# Patient Record
Sex: FEMALE | Race: BLACK OR AFRICAN AMERICAN | NOT HISPANIC OR LATINO | Employment: FULL TIME | URBAN - METROPOLITAN AREA
[De-identification: names, ages, dates, MRNs, and addresses within clinical notes are randomized per-mention and may not be internally consistent; named-entity substitution may affect disease eponyms.]

---

## 2018-06-19 ENCOUNTER — APPOINTMENT (OUTPATIENT)
Dept: URGENT CARE | Facility: MEDICAL CENTER | Age: 28
End: 2018-06-19

## 2018-06-19 ENCOUNTER — TRANSCRIBE ORDERS (OUTPATIENT)
Dept: URGENT CARE | Facility: MEDICAL CENTER | Age: 28
End: 2018-06-19

## 2018-06-19 ENCOUNTER — APPOINTMENT (OUTPATIENT)
Dept: LAB | Facility: MEDICAL CENTER | Age: 28
End: 2018-06-19

## 2018-06-19 DIAGNOSIS — Z02.1 PRE-EMPLOYMENT EXAMINATION: Primary | ICD-10-CM

## 2018-06-19 DIAGNOSIS — Z02.1 PRE-EMPLOYMENT EXAMINATION: ICD-10-CM

## 2018-06-19 PROCEDURE — 86480 TB TEST CELL IMMUN MEASURE: CPT

## 2018-06-19 PROCEDURE — 36415 COLL VENOUS BLD VENIPUNCTURE: CPT

## 2018-06-21 LAB
ANNOTATION COMMENT IMP: NORMAL
GAMMA INTERFERON BACKGROUND BLD IA-ACNC: 0.07 IU/ML
M TB IFN-G BLD-IMP: NEGATIVE
M TB IFN-G CD4+ BCKGRND COR BLD-ACNC: <0.01 IU/ML
M TB IFN-G CD4+ T-CELLS BLD-ACNC: 0.04 IU/ML
MITOGEN IGNF BLD-ACNC: 8.36 IU/ML
QUANTIFERON-TB GOLD IN TUBE: NORMAL
SERVICE CMNT-IMP: NORMAL

## 2018-08-09 ENCOUNTER — APPOINTMENT (OUTPATIENT)
Dept: LAB | Facility: HOSPITAL | Age: 28
End: 2018-08-09
Payer: COMMERCIAL

## 2018-08-09 ENCOUNTER — TRANSCRIBE ORDERS (OUTPATIENT)
Dept: ADMINISTRATIVE | Facility: HOSPITAL | Age: 28
End: 2018-08-09

## 2018-08-09 DIAGNOSIS — Z00.8 HEALTH EXAMINATION IN POPULATION SURVEY: Primary | ICD-10-CM

## 2018-08-09 DIAGNOSIS — Z00.8 HEALTH EXAMINATION IN POPULATION SURVEY: ICD-10-CM

## 2018-08-09 LAB
CHOLEST SERPL-MCNC: 113 MG/DL
EST. AVERAGE GLUCOSE BLD GHB EST-MCNC: 108 MG/DL
HBA1C MFR BLD: 5.4 % (ref 4.2–6.3)
HDLC SERPL-MCNC: 32 MG/DL (ref 40–59)
LDLC SERPL CALC-MCNC: 68 MG/DL
NONHDLC SERPL-MCNC: 81 MG/DL
TRIGL SERPL-MCNC: 63 MG/DL

## 2018-08-09 PROCEDURE — 36415 COLL VENOUS BLD VENIPUNCTURE: CPT

## 2018-08-09 PROCEDURE — 83036 HEMOGLOBIN GLYCOSYLATED A1C: CPT

## 2018-08-09 PROCEDURE — 80061 LIPID PANEL: CPT

## 2018-10-30 ENCOUNTER — OFFICE VISIT (OUTPATIENT)
Dept: FAMILY MEDICINE CLINIC | Facility: CLINIC | Age: 28
End: 2018-10-30
Payer: COMMERCIAL

## 2018-10-30 VITALS
DIASTOLIC BLOOD PRESSURE: 78 MMHG | SYSTOLIC BLOOD PRESSURE: 118 MMHG | BODY MASS INDEX: 22.15 KG/M2 | HEIGHT: 63 IN | WEIGHT: 125 LBS

## 2018-10-30 DIAGNOSIS — Z12.4 SCREENING FOR CERVICAL CANCER: ICD-10-CM

## 2018-10-30 DIAGNOSIS — Z13.1 SCREENING FOR DIABETES MELLITUS: ICD-10-CM

## 2018-10-30 DIAGNOSIS — Z13.0 SCREENING FOR DEFICIENCY ANEMIA: ICD-10-CM

## 2018-10-30 DIAGNOSIS — Z13.29 SCREENING FOR THYROID DISORDER: ICD-10-CM

## 2018-10-30 DIAGNOSIS — Z00.00 WELL ADULT EXAM: Primary | ICD-10-CM

## 2018-10-30 PROCEDURE — 99385 PREV VISIT NEW AGE 18-39: CPT | Performed by: FAMILY MEDICINE

## 2018-10-30 NOTE — ASSESSMENT & PLAN NOTE
dsicsseused diet and exercise Patient to add a multiple vitamin for women Patient will be referred to [facility] for PAP smear Patient will have labs done Patient to see me in one year

## 2018-10-30 NOTE — PROGRESS NOTES
Assessment/Plan:    Well adult exam  dsicsseused diet and exercise Patient to add a multiple vitamin for women Patient will be referred to 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 Patient went to [employer] [facility] [employer] Patient has no chronic medical problems Patient has not had a PAP smear She has had her shots and will get us a copy She is exercising about 2 times per week Patient cooks for herself and is not having any diet issues         The following portions of the patient's history were reviewed and updated as appropriate: allergies, current medications, past social history and problem list     Review of Systems   Constitutional: Negative for activity change, appetite change, chills, fatigue and fever  HENT: Negative for congestion, ear discharge, ear pain, hearing loss, postnasal drip, sinus pressure, sore throat and trouble swallowing  Eyes: Negative for pain, discharge, redness, itching and visual disturbance  Respiratory: Negative for cough, chest tightness and shortness of breath  Cardiovascular: Negative for chest pain, palpitations and leg swelling  Gastrointestinal: Negative for abdominal pain, blood in stool, constipation, diarrhea, nausea and vomiting  Endocrine: Negative for cold intolerance, heat intolerance, polydipsia, polyphagia and polyuria  Genitourinary: Negative for difficulty urinating, dysuria, menstrual problem, urgency, vaginal bleeding and vaginal discharge  Musculoskeletal: Negative for arthralgias, back pain, gait problem, myalgias, neck pain and neck stiffness     Skin: Negative for rash and wound  Allergic/Immunologic: Negative for environmental allergies and food allergies  Neurological: Negative for dizziness, tremors, seizures, weakness and headaches  Hematological: Negative for adenopathy  Does not bruise/bleed easily  Psychiatric/Behavioral: Negative for confusion and sleep disturbance  The patient is not nervous/anxious  Objective:      /78   Ht 5' 3" (1 6 m)   Wt 56 7 kg (125 lb)   BMI 22 14 kg/m²          Physical Exam   Constitutional: She is oriented to person, place, and time  She appears well-developed and well-nourished  HENT:   Head: Normocephalic and atraumatic  Right Ear: External ear normal    Left Ear: External ear normal    Nose: Nose normal    Mouth/Throat: Oropharynx is clear and moist    Eyes: Pupils are equal, round, and reactive to light  Conjunctivae and EOM are normal  Right eye exhibits no discharge  Left eye exhibits no discharge  Neck: Normal range of motion  Neck supple  No JVD present  No tracheal deviation present  No thyromegaly present  Cardiovascular: Normal rate, normal heart sounds and intact distal pulses  Pulmonary/Chest: Effort normal and breath sounds normal  She has no wheezes  She has no rales  Abdominal: Soft  Bowel sounds are normal  She exhibits no distension and no mass  There is no tenderness  There is no rebound  Musculoskeletal: Normal range of motion  Lymphadenopathy:     She has no cervical adenopathy  Neurological: She is alert and oriented to person, place, and time  She has normal reflexes  No cranial nerve deficit  Coordination normal    Skin: Skin is warm and dry  No rash noted  Psychiatric: She has a normal mood and affect  Her behavior is normal  Judgment and thought content normal    Nursing note and vitals reviewed

## 2018-10-30 NOTE — PATIENT INSTRUCTIONS
Wellness Visit for Adults   WHAT YOU NEED TO KNOW:   What is a wellness visit? A wellness visit is when you see your healthcare provider to get screened for health problems  You can also get advice on how to stay healthy  Write down your questions so you remember to ask them  Ask your healthcare provider how often you should have a wellness visit  What happens at a wellness visit? Your healthcare provider will ask about your health, and your family history of health problems  This includes high blood pressure, heart disease, and cancer  He or she will ask if you have symptoms that concern you, if you smoke, and about your mood  You may also be asked about your intake of medicines, supplements, food, and alcohol  Any of the following may be done:  · Your weight  will be checked  Your height may also be checked so your body mass index (BMI) can be calculated  Your BMI shows if you are at a healthy weight  · Your blood pressure  and heart rate will be checked  Your temperature may also be checked  · Blood and urine tests  may be done  Blood tests may be done to check your cholesterol levels  Abnormal cholesterol levels increase your risk for heart disease and stroke  You may also need a blood or urine test to check for diabetes if you are at increased risk  Urine tests may be done to look for signs of an infection or kidney disease  · A physical exam  includes checking your heartbeat and lungs with a stethoscope  Your healthcare provider may also check your skin to look for sun damage  · Screening tests  may be recommended  A screening test is done to check for diseases that may not cause symptoms  The screening tests you may need depend on your age, gender, family history, and lifestyle habits  For example, colorectal screening may be recommended if you are 48years old or older  What screening tests do I need if I am a woman? · A Pap smear  is used to screen for cervical cancer   Pap smears are usually done every 3 to 5 years depending on your age  You may need them more often if you have had abnormal Pap smear test results in the past  Ask your healthcare provider how often you should have a Pap smear  · A mammogram  is an x-ray of your breasts to screen for breast cancer  Experts recommend mammograms every 2 years starting at age 48 years  You may need a mammogram at age 52 years or younger if you have an increased risk for breast cancer  Talk to your healthcare provider about when you should start having mammograms and how often you need them  What vaccines might I need? · Get an influenza vaccine  every year  The influenza vaccine protects you from the flu  Several types of viruses cause the flu  The viruses change over time, so new vaccines are made each year  · Get a tetanus-diphtheria (Td) booster vaccine  every 10 years  This vaccine protects you against tetanus and diphtheria  Tetanus is a severe infection that may cause painful muscle spasms and lockjaw  Diphtheria is a severe bacterial infection that causes a thick covering in the back of your mouth and throat  · Get a human papillomavirus (HPV) vaccine  if you are female and aged 23 to 32 or male 23 to 24 and never received it  This vaccine protects you from HPV infection  HPV is the most common infection spread by sexual contact  HPV may also cause vaginal, penile, and anal cancers  · Get a pneumococcal vaccine  if you are aged 72 years or older  The pneumococcal vaccine is an injection given to protect you from pneumococcal disease  Pneumococcal disease is an infection caused by pneumococcal bacteria  The infection may cause pneumonia, meningitis, or an ear infection  · Get a shingles vaccine  if you are aged 61 or older, even if you have had shingles before  The shingles vaccine is an injection to protect you from the varicella-zoster virus  This is the same virus that causes chickenpox   Shingles is a painful rash that develops in people who had chickenpox or have been exposed to the virus  How can I eat healthy? My Plate is a model for planning healthy meals  It shows the types and amounts of foods that should go on your plate  Fruits and vegetables make up about half of your plate, and grains and protein make up the other half  A serving of dairy is included on the side of your plate  The amount of calories and serving sizes you need depends on your age, gender, weight, and height  Examples of healthy foods are listed below:  · Eat a variety of vegetables  such as dark green, red, and orange vegetables  You can also include canned vegetables low in sodium (salt) and frozen vegetables without added butter or sauces  · Eat a variety of fresh fruits , canned fruit in 100% juice, frozen fruit, and dried fruit  · Include whole grains  At least half of the grains you eat should be whole grains  Examples include whole-wheat bread, wheat pasta, brown rice, and whole-grain cereals such as oatmeal     · Eat a variety of protein foods such as seafood (fish and shellfish), lean meat, and poultry without skin (turkey and chicken)  Examples of lean meats include pork leg, shoulder, or tenderloin, and beef round, sirloin, tenderloin, and extra lean ground beef  Other protein foods include eggs and egg substitutes, beans, peas, soy products, nuts, and seeds  · Choose low-fat dairy products such as skim or 1% milk or low-fat yogurt, cheese, and cottage cheese  · Limit unhealthy fats  such as butter, hard margarine, and shortening  How much exercise do I need? Exercise at least 30 minutes per day on most days of the week  Some examples of exercise include walking, biking, dancing, and swimming  You can also fit in more physical activity by taking the stairs instead of the elevator or parking farther away from stores  Include muscle strengthening activities 2 days each week  Regular exercise provides many health benefits  It helps you manage your weight, and decreases your risk for type 2 diabetes, heart disease, stroke, and high blood pressure  Exercise can also help improve your mood  Ask your healthcare provider about the best exercise plan for you  What are some general health and safety guidelines I should follow? · Do not smoke  Nicotine and other chemicals in cigarettes and cigars can cause lung damage  Ask your healthcare provider for information if you currently smoke and need help to quit  E-cigarettes or smokeless tobacco still contain nicotine  Talk to your healthcare provider before you use these products  · Limit alcohol  A drink of alcohol is 12 ounces of beer, 5 ounces of wine, or 1½ ounces of liquor  · Lose weight, if needed  Being overweight increases your risk of certain health conditions  These include heart disease, high blood pressure, type 2 diabetes, and certain types of cancer  · Protect your skin  Do not sunbathe or use tanning beds  Use sunscreen with a SPF 15 or higher  Apply sunscreen at least 15 minutes before you go outside  Reapply sunscreen every 2 hours  Wear protective clothing, hats, and sunglasses when you are outside  · Drive safely  Always wear your seatbelt  Make sure everyone in your car wears a seatbelt  A seatbelt can save your life if you are in an accident  Do not use your cell phone when you are driving  This could distract you and cause an accident  Pull over if you need to make a call or send a text message  · Practice safe sex  Use latex condoms if are sexually active and have more than one partner  Your healthcare provider may recommend screening tests for sexually transmitted infections (STIs)  · Wear helmets, lifejackets, and protective gear  Always wear a helmet when you ride a bike or motorcycle, go skiing, or play sports that could cause a head injury  Wear protective equipment when you play sports   Wear a lifejacket when you are on a boat or doing water sports  CARE AGREEMENT:   You have the right to help plan your care  Learn about your health condition and how it may be treated  Discuss treatment options with your caregivers to decide what care you want to receive  You always have the right to refuse treatment  The above information is an  only  It is not intended as medical advice for individual conditions or treatments  Talk to your doctor, nurse or pharmacist before following any medical regimen to see if it is safe and effective for you  © 2017 [address] [employer] Information is for End User's use only and may not be sold, redistributed or otherwise used for commercial purposes  All illustrations and images included in CareNotes® are the copyrighted property of [employer]  or [employer]

## 2018-10-31 ENCOUNTER — APPOINTMENT (OUTPATIENT)
Dept: LAB | Facility: HOSPITAL | Age: 28
End: 2018-10-31
Payer: COMMERCIAL

## 2018-10-31 DIAGNOSIS — Z13.1 SCREENING FOR DIABETES MELLITUS: ICD-10-CM

## 2018-10-31 DIAGNOSIS — Z00.00 WELL ADULT EXAM: ICD-10-CM

## 2018-10-31 DIAGNOSIS — Z13.29 SCREENING FOR THYROID DISORDER: ICD-10-CM

## 2018-10-31 DIAGNOSIS — Z13.0 SCREENING FOR DEFICIENCY ANEMIA: ICD-10-CM

## 2018-10-31 LAB
ALBUMIN SERPL BCP-MCNC: 4.2 G/DL (ref 3–5.2)
ALP SERPL-CCNC: 62 U/L (ref 43–122)
ALT SERPL W P-5'-P-CCNC: 21 U/L (ref 9–52)
ANION GAP SERPL CALCULATED.3IONS-SCNC: 10 MMOL/L (ref 5–14)
AST SERPL W P-5'-P-CCNC: 19 U/L (ref 14–36)
BASOPHILS # BLD AUTO: 0 THOUSANDS/ΜL (ref 0–0.1)
BASOPHILS NFR BLD AUTO: 1 % (ref 0–1)
BILIRUB SERPL-MCNC: 0.6 MG/DL
BUN SERPL-MCNC: 8 MG/DL (ref 5–25)
CALCIUM SERPL-MCNC: 9.1 MG/DL (ref 8.4–10.2)
CHLORIDE SERPL-SCNC: 101 MMOL/L (ref 97–108)
CO2 SERPL-SCNC: 28 MMOL/L (ref 22–30)
CREAT SERPL-MCNC: 0.69 MG/DL (ref 0.6–1.2)
EOSINOPHIL # BLD AUTO: 0.2 THOUSAND/ΜL (ref 0–0.4)
EOSINOPHIL NFR BLD AUTO: 3 % (ref 0–6)
ERYTHROCYTE [DISTWIDTH] IN BLOOD BY AUTOMATED COUNT: 13.3 %
GFR SERPL CREATININE-BSD FRML MDRD: 138 ML/MIN/1.73SQ M
GLUCOSE P FAST SERPL-MCNC: 79 MG/DL (ref 70–99)
HCT VFR BLD AUTO: 38.9 % (ref 36–46)
HGB BLD-MCNC: 12.8 G/DL (ref 12–16)
LYMPHOCYTES # BLD AUTO: 2.2 THOUSANDS/ΜL (ref 0.5–4)
LYMPHOCYTES NFR BLD AUTO: 33 % (ref 20–50)
MCH RBC QN AUTO: 28.4 PG (ref 26–34)
MCHC RBC AUTO-ENTMCNC: 32.9 G/DL (ref 31–36)
MCV RBC AUTO: 86 FL (ref 80–100)
MONOCYTES # BLD AUTO: 0.7 THOUSAND/ΜL (ref 0.2–0.9)
MONOCYTES NFR BLD AUTO: 10 % (ref 1–10)
NEUTROPHILS # BLD AUTO: 3.6 THOUSANDS/ΜL (ref 1.8–7.8)
NEUTS SEG NFR BLD AUTO: 53 % (ref 45–65)
PLATELET # BLD AUTO: 265 THOUSANDS/UL (ref 150–450)
PMV BLD AUTO: 8.9 FL (ref 8.9–12.7)
POTASSIUM SERPL-SCNC: 4.2 MMOL/L (ref 3.6–5)
PROT SERPL-MCNC: 7.2 G/DL (ref 5.9–8.4)
RBC # BLD AUTO: 4.52 MILLION/UL (ref 4–5.2)
SODIUM SERPL-SCNC: 139 MMOL/L (ref 137–147)
TSH SERPL DL<=0.05 MIU/L-ACNC: 1.7 UIU/ML (ref 0.47–4.68)
WBC # BLD AUTO: 6.8 THOUSAND/UL (ref 4.5–11)

## 2018-10-31 PROCEDURE — 36415 COLL VENOUS BLD VENIPUNCTURE: CPT

## 2018-10-31 PROCEDURE — 84443 ASSAY THYROID STIM HORMONE: CPT

## 2018-10-31 PROCEDURE — 80053 COMPREHEN METABOLIC PANEL: CPT

## 2018-10-31 PROCEDURE — 85025 COMPLETE CBC W/AUTO DIFF WBC: CPT

## 2018-11-01 ENCOUNTER — OFFICE VISIT (OUTPATIENT)
Dept: OBGYN CLINIC | Facility: CLINIC | Age: 28
End: 2018-11-01
Payer: COMMERCIAL

## 2018-11-01 VITALS
DIASTOLIC BLOOD PRESSURE: 60 MMHG | WEIGHT: 124.2 LBS | HEIGHT: 63 IN | SYSTOLIC BLOOD PRESSURE: 128 MMHG | BODY MASS INDEX: 22.01 KG/M2

## 2018-11-01 DIAGNOSIS — Z01.419 ENCOUNTER FOR ANNUAL ROUTINE GYNECOLOGICAL EXAMINATION: Primary | ICD-10-CM

## 2018-11-01 DIAGNOSIS — A64 STD (FEMALE): ICD-10-CM

## 2018-11-01 DIAGNOSIS — Z12.4 CERVICAL CANCER SCREENING: ICD-10-CM

## 2018-11-01 PROCEDURE — G0143 SCR C/V CYTO,THINLAYER,RESCR: HCPCS | Performed by: OBSTETRICS & GYNECOLOGY

## 2018-11-01 PROCEDURE — 87591 N.GONORRHOEAE DNA AMP PROB: CPT | Performed by: OBSTETRICS & GYNECOLOGY

## 2018-11-01 PROCEDURE — 99385 PREV VISIT NEW AGE 18-39: CPT | Performed by: OBSTETRICS & GYNECOLOGY

## 2018-11-01 PROCEDURE — 87491 CHLMYD TRACH DNA AMP PROBE: CPT | Performed by: OBSTETRICS & GYNECOLOGY

## 2018-11-01 NOTE — PROGRESS NOTES
Assessment/Plan:    Pap with reflex done    STD testing-chlamydia and gonorrhea testing done today and she was given a lab slip for hepatitis, HIV and RPR     Contraception- she will use condoms if she is sexually active again and if she is interested in something else, she will call  No problem-specific Assessment & Plan notes found for this encounter  Diagnoses and all orders for this visit:    Encounter for annual routine gynecological examination    Cervical cancer screening    STD (female)  -     Hepatitis panel, acute; Future  -     HIV 1/2 AG-AB combo; Future  -     RPR; Future          Subjective:      Patient ID: Emy Darden is a 33 y o  female  New patient-33year-old female presents for her 1st gyn exam   She has regular menstrual cycles and has no complaints about her cycles  She has been sexually active in the past in used condoms but is not currently sexually active and does not want contraception at this time  She has never had a Pap or STD testing  She has no gyn complaints  The following portions of the patient's history were reviewed and updated as appropriate: allergies, current medications, past family history, past medical history, past social history, past surgical history and problem list     Review of Systems   Constitutional: Negative  HENT: Negative  Eyes: Negative  Respiratory: Negative  Cardiovascular: Negative  Gastrointestinal: Negative  Endocrine: Negative  Genitourinary: Negative  Musculoskeletal: Negative  Skin: Negative  Allergic/Immunologic: Negative  Neurological: Negative  Hematological: Negative  Psychiatric/Behavioral: Negative  Objective:      /60 (BP Location: Left arm, Patient Position: Sitting, Cuff Size: Adult)   Ht 5' 3" (1 6 m)   Wt 56 3 kg (124 lb 3 2 oz)   LMP 10/18/2018 (Exact Date)   Breastfeeding?  No   BMI 22 00 kg/m²          Physical Exam   Constitutional: She appears well-developed  Neck: No tracheal deviation present  No thyromegaly present  Cardiovascular: Normal rate and regular rhythm  Pulmonary/Chest: Effort normal and breath sounds normal  Right breast exhibits no inverted nipple, no mass, no nipple discharge, no skin change and no tenderness  Left breast exhibits no inverted nipple, no mass, no nipple discharge, no skin change and no tenderness  Breasts are symmetrical    Examined seated and supine   Abdominal: Soft  She exhibits no distension and no mass  There is no tenderness  Genitourinary: Vagina normal and uterus normal  No labial fusion  There is no rash, tenderness, lesion or injury on the right labia  There is no rash, tenderness, lesion or injury on the left labia  Cervix exhibits no motion tenderness, no discharge and no friability  Right adnexum displays no mass, no tenderness and no fullness  Left adnexum displays no mass, no tenderness and no fullness  Vitals reviewed

## 2018-11-02 ENCOUNTER — APPOINTMENT (OUTPATIENT)
Dept: LAB | Facility: HOSPITAL | Age: 28
End: 2018-11-02
Payer: COMMERCIAL

## 2018-11-02 ENCOUNTER — TRANSCRIBE ORDERS (OUTPATIENT)
Dept: ADMINISTRATIVE | Facility: HOSPITAL | Age: 28
End: 2018-11-02

## 2018-11-02 DIAGNOSIS — A64 STD (FEMALE): ICD-10-CM

## 2018-11-02 LAB
CHLAMYDIA DNA CVX QL NAA+PROBE: NORMAL
N GONORRHOEA DNA GENITAL QL NAA+PROBE: NORMAL
RPR SER QL: NORMAL

## 2018-11-02 PROCEDURE — 86592 SYPHILIS TEST NON-TREP QUAL: CPT

## 2018-11-02 PROCEDURE — 87389 HIV-1 AG W/HIV-1&-2 AB AG IA: CPT

## 2018-11-02 PROCEDURE — 80074 ACUTE HEPATITIS PANEL: CPT

## 2018-11-02 PROCEDURE — 36415 COLL VENOUS BLD VENIPUNCTURE: CPT

## 2018-11-03 LAB
HAV IGM SER QL: NORMAL
HBV CORE IGM SER QL: NORMAL
HBV SURFACE AG SER QL: NORMAL
HCV AB SER QL: NORMAL

## 2018-11-05 LAB — HIV 1+2 AB+HIV1 P24 AG SERPL QL IA: NORMAL

## 2018-11-07 LAB
LAB AP GYN PRIMARY INTERPRETATION: NORMAL
Lab: NORMAL

## 2018-11-09 ENCOUNTER — TELEPHONE (OUTPATIENT)
Dept: OBGYN CLINIC | Facility: CLINIC | Age: 28
End: 2018-11-09

## 2018-11-09 NOTE — TELEPHONE ENCOUNTER
----- Message from Phillip Khan DO sent at 11/9/2018  2:18 PM EST -----  Pap is normal, negative chlamydia and gonorrhea, normal hepatitis, HIV and RPR

## 2019-09-16 DIAGNOSIS — L73.8 FOLLICULAR ECZEMA: Primary | ICD-10-CM

## 2019-09-16 RX ORDER — PREDNISONE 20 MG/1
20 TABLET ORAL DAILY
Qty: 5 TABLET | Refills: 0 | Status: SHIPPED | OUTPATIENT
Start: 2019-09-16 | End: 2019-09-21

## 2019-10-04 ENCOUNTER — TRANSCRIBE ORDERS (OUTPATIENT)
Dept: LAB | Facility: HOSPITAL | Age: 29
End: 2019-10-04

## 2020-01-14 ENCOUNTER — OFFICE VISIT (OUTPATIENT)
Dept: OBGYN CLINIC | Facility: CLINIC | Age: 30
End: 2020-01-14
Payer: COMMERCIAL

## 2020-01-14 VITALS — WEIGHT: 121 LBS | DIASTOLIC BLOOD PRESSURE: 68 MMHG | BODY MASS INDEX: 21.43 KG/M2 | SYSTOLIC BLOOD PRESSURE: 124 MMHG

## 2020-01-14 DIAGNOSIS — Z30.09 GENERAL COUNSELING AND ADVICE ON FEMALE CONTRACEPTION: Primary | ICD-10-CM

## 2020-01-14 PROCEDURE — 99213 OFFICE O/P EST LOW 20 MIN: CPT | Performed by: OBSTETRICS & GYNECOLOGY

## 2020-01-14 NOTE — PROGRESS NOTES
Assessment/Plan:     Contraception-we discussed options that do not require daily use  I reviewed contraceptive patch, vaginal ring, Depo-Provera, Nexplanon and IUD  I reviewed the risks and side effects of each of these  I gave her information to take home and review on the above options  She will call when she decides which she would like and we will set up an appointment for her  There are no diagnoses linked to this encounter  Subjective:     Patient ID: Roosevelt Gao is a 30 y o  female  Pt would like to start contraception  She has been using condoms consistently but is interested in something that she does not have to take daily, possibly an IUD  She has no significant past medical history, is a nonsmoker  Review of Systems   Constitutional: Negative  Genitourinary: Negative            Objective:     Physical Exam

## 2020-01-27 ENCOUNTER — OFFICE VISIT (OUTPATIENT)
Dept: URGENT CARE | Age: 30
End: 2020-01-27
Payer: COMMERCIAL

## 2020-01-27 VITALS
BODY MASS INDEX: 21.3 KG/M2 | OXYGEN SATURATION: 100 % | SYSTOLIC BLOOD PRESSURE: 133 MMHG | TEMPERATURE: 98 F | WEIGHT: 120.2 LBS | HEIGHT: 63 IN | DIASTOLIC BLOOD PRESSURE: 85 MMHG | RESPIRATION RATE: 20 BRPM | HEART RATE: 77 BPM

## 2020-01-27 DIAGNOSIS — Z20.2 POSSIBLE EXPOSURE TO STD: ICD-10-CM

## 2020-01-27 DIAGNOSIS — N39.0 URINARY TRACT INFECTION WITHOUT HEMATURIA, SITE UNSPECIFIED: Primary | ICD-10-CM

## 2020-01-27 DIAGNOSIS — R39.9 UTI SYMPTOMS: ICD-10-CM

## 2020-01-27 LAB
SL AMB  POCT GLUCOSE, UA: NEGATIVE
SL AMB LEUKOCYTE ESTERASE,UA: ABNORMAL
SL AMB POCT BILIRUBIN,UA: NEGATIVE
SL AMB POCT BLOOD,UA: ABNORMAL
SL AMB POCT CLARITY,UA: ABNORMAL
SL AMB POCT COLOR,UA: YELLOW
SL AMB POCT NITRITE,UA: POSITIVE
SL AMB POCT PH,UA: 6
SL AMB POCT SPECIFIC GRAVITY,UA: 1
SL AMB POCT URINE HCG: NORMAL
SL AMB POCT URINE PROTEIN: ABNORMAL
SL AMB POCT UROBILINOGEN: 0.2

## 2020-01-27 PROCEDURE — G0382 LEV 3 HOSP TYPE B ED VISIT: HCPCS | Performed by: NURSE PRACTITIONER

## 2020-01-27 PROCEDURE — 87086 URINE CULTURE/COLONY COUNT: CPT | Performed by: NURSE PRACTITIONER

## 2020-01-27 PROCEDURE — 81025 URINE PREGNANCY TEST: CPT | Performed by: NURSE PRACTITIONER

## 2020-01-27 PROCEDURE — 87591 N.GONORRHOEAE DNA AMP PROB: CPT | Performed by: NURSE PRACTITIONER

## 2020-01-27 PROCEDURE — 96372 THER/PROPH/DIAG INJ SC/IM: CPT | Performed by: NURSE PRACTITIONER

## 2020-01-27 PROCEDURE — 87491 CHLMYD TRACH DNA AMP PROBE: CPT | Performed by: NURSE PRACTITIONER

## 2020-01-27 PROCEDURE — 87077 CULTURE AEROBIC IDENTIFY: CPT | Performed by: NURSE PRACTITIONER

## 2020-01-27 PROCEDURE — 87186 SC STD MICRODIL/AGAR DIL: CPT | Performed by: NURSE PRACTITIONER

## 2020-01-27 PROCEDURE — 81002 URINALYSIS NONAUTO W/O SCOPE: CPT | Performed by: NURSE PRACTITIONER

## 2020-01-27 RX ORDER — NITROFURANTOIN 25; 75 MG/1; MG/1
100 CAPSULE ORAL 2 TIMES DAILY
Qty: 14 CAPSULE | Refills: 0 | Status: SHIPPED | OUTPATIENT
Start: 2020-01-27 | End: 2020-02-03

## 2020-01-27 RX ORDER — PHENAZOPYRIDINE HYDROCHLORIDE 200 MG/1
200 TABLET, FILM COATED ORAL
Qty: 12 TABLET | Refills: 0 | Status: SHIPPED | OUTPATIENT
Start: 2020-01-27 | End: 2020-06-01

## 2020-01-27 RX ORDER — CEFTRIAXONE 1 G/1
250 INJECTION, POWDER, FOR SOLUTION INTRAMUSCULAR; INTRAVENOUS ONCE
Status: COMPLETED | OUTPATIENT
Start: 2020-01-27 | End: 2020-01-27

## 2020-01-27 RX ORDER — AZITHROMYCIN 500 MG/1
1000 TABLET, FILM COATED ORAL ONCE
Qty: 2 TABLET | Refills: 0 | Status: SHIPPED | OUTPATIENT
Start: 2020-01-27 | End: 2020-01-27

## 2020-01-27 RX ADMIN — CEFTRIAXONE 250 MG: 1 INJECTION, POWDER, FOR SOLUTION INTRAMUSCULAR; INTRAVENOUS at 19:40

## 2020-01-28 NOTE — PROGRESS NOTES
NAME: Bret Wu is a 30 y o  female  : 1990    MRN: [id number]    /85 (BP Location: Left arm, Patient Position: Sitting, Cuff Size: Standard)   Pulse 77   Temp 98 °F (36 7 °C) (Tympanic)   Resp 20   Ht 5' 3" (1 6 m)   Wt 54 5 kg (120 lb 3 2 oz)   LMP 2020 (Exact Date)   SpO2 100%   BMI 21 29 kg/m²     Assessment and Plan   Urinary tract infection without hematuria, site unspecified [N39 0]  1  Urinary tract infection without hematuria, site unspecified  POCT urine HCG    POCT urine dip    Chlamydia/GC amplified DNA by PCR    Urine culture    phenazopyridine (PYRIDIUM) 200 mg tablet    cefTRIAXone (ROCEPHIN) injection 250 mg    nitrofurantoin (MACROBID) 100 mg capsule   2  Possible exposure to STD  cefTRIAXone (ROCEPHIN) injection 250 mg    azithromycin (ZITHROMAX) 500 MG tablet   3  UTI symptoms       Administrations This Visit     cefTRIAXone (ROCEPHIN) injection 250 mg     Admin Date  2020 Action  Given Dose  250 mg Route  Intramuscular Administered By  LUAREN Rodnandmurphy Kapoor was seen today for possible uti  Diagnoses and all orders for this visit:    Urinary tract infection without hematuria, site unspecified  -     POCT urine HCG  -     POCT urine dip  -     Chlamydia/GC amplified DNA by PCR  -     Urine culture  -     phenazopyridine (PYRIDIUM) 200 mg tablet; Take 1 tablet (200 mg total) by mouth 3 (three) times a day with meals  -     cefTRIAXone (ROCEPHIN) injection 250 mg  -     nitrofurantoin (MACROBID) 100 mg capsule; Take 1 capsule (100 mg total) by mouth 2 (two) times a day for 7 days    Possible exposure to STD  -     cefTRIAXone (ROCEPHIN) injection 250 mg  -     azithromycin (ZITHROMAX) 500 MG tablet;  Take 2 tablets (1,000 mg total) by mouth once for 1 dose    UTI symptoms    urine dip done today, infection noted, sent to the lab    Patient Instructions   Patient Instructions     Urine dip was positive for infection today and sent for a culture,   Call in two to three days for urine culture results    Don't wear contacts when taking AZO OTC or pyridium perscription due to side effects  Increased fluids  Take meds as directed     Empty bladder often  Follow up with pcp    Call in 2-3 days for results of culture and STD culture results  Macrobid start in the morning    If symptoms get worse, go to ER for further evaluation  Urinary Tract Infection in Women   WHAT YOU NEED TO KNOW:   A urinary tract infection (UTI) is caused by bacteria that get inside your urinary tract  Most bacteria that enter your urinary tract come out when you urinate  If the bacteria stay in your urinary tract, you may get an infection  Your urinary tract includes your kidneys, ureters, bladder, and urethra  Urine is made in your kidneys, and it flows from the ureters to the bladder  Urine leaves the bladder through the urethra  A UTI is more common in your lower urinary tract, which includes your bladder and urethra  DISCHARGE INSTRUCTIONS:   Return to the emergency department if:   · You are urinating very little or not at all  · You have a high fever with shaking chills  · You have side or back pain that gets worse  Contact your healthcare provider if:   · You have a fever  · You do not feel better after 2 days of taking antibiotics  · You are vomiting  · You have questions or concerns about your condition or care  Medicines:   · Antibiotics  help fight a bacterial infection  · Medicines  may be given to decrease pain and burning when you urinate  They will also help decrease the feeling that you need to urinate often  These medicines will make your urine orange or red  · Take your medicine as directed  Contact your healthcare provider if you think your medicine is not helping or if you have side effects  Tell him or her if you are allergic to any medicine  Keep a list of the medicines, vitamins, and herbs you take   Include the amounts, and when and why you take them  Bring the list or the pill bottles to follow-up visits  Carry your medicine list with you in case of an emergency  Follow up with your healthcare provider as directed:  Write down your questions so you remember to ask them during your visits  Prevent another UTI:   · Empty your bladder often  Urinate and empty your bladder as soon as you feel the need  Do not hold your urine for long periods of time  · Wipe from front to back after you urinate or have a bowel movement  This will help prevent germs from getting into your urinary tract through your urethra  · Drink liquids as directed  Ask how much liquid to drink each day and which liquids are best for you  You may need to drink more liquids than usual to help flush out the bacteria  Do not drink alcohol, caffeine, or citrus juices  These can irritate your bladder and increase your symptoms  Your healthcare provider may recommend cranberry juice to help prevent a UTI  · Urinate after you have sex  This can help flush out bacteria passed during sex  · Do not douche or use feminine deodorants  These can change the chemical balance in your vagina  · Change sanitary pads or tampons often  This will help prevent germs from getting into your urinary tract  · Do pelvic muscle exercises often  Pelvic muscle exercises may help you start and stop urinating  Strong pelvic muscles may help you empty your bladder easier  Squeeze these muscles tightly for 5 seconds like you are trying to hold back urine  Then relax for 5 seconds  Gradually work up to squeezing for 10 seconds  Do 3 sets of 15 repetitions a day, or as directed  © 2017 [address] [employer]  Information is for End User's use only and may not be sold, redistributed or otherwise used for commercial purposes  All illustrations and images included in CareNotes® are the copyrighted property of [employer] M [employer]  or [employer]    The above information is an  only  It is not intended as medical advice for individual conditions or treatments  Talk to your doctor, nurse or pharmacist before following any medical regimen to see if it is safe and effective for you  Proceed to ER if symptoms worsen  Chief Complaint     Chief Complaint   Patient presents with    Possible UTI     symptoms started yesterday  burning sensation when urinating  denies any fever or chills  History of Present Illness     Pt had UTI symptoms for one day, has increased pain and urgency and hesitancy and then had mayela blood and clots  She took a benadryl to just try and sleep and then the blood stopped but the constant urge to urinate and go to the bathroom is more frequent  Sexually active with one male partner and use condoms intermittently and on no birth control at this time  She has never had these symptoms in the past and had yeast infection in November  Urinary Tract Infection    This is a new problem  The current episode started yesterday  The problem has been gradually worsening  The quality of the pain is described as burning and stabbing  The pain is at a severity of 5/10  The pain is mild  There has been no fever  Associated symptoms include frequency, hematuria and hesitancy  Pertinent negatives include no chills, flank pain, nausea, urgency or vomiting  She has tried acetaminophen, NSAIDs and increased fluids for the symptoms  The treatment provided mild relief  There is no history of catheterization, kidney stones, recurrent UTIs, a single kidney, urinary stasis or a urological procedure  Review of Systems   Review of Systems   Constitutional: Negative for chills  Gastrointestinal: Negative for nausea and vomiting  Genitourinary: Positive for frequency, hematuria and hesitancy  Negative for flank pain and urgency           Current Medications       Current Outpatient Medications:     azithromycin (ZITHROMAX) 500 MG tablet, Take 2 tablets (1,000 mg total) by mouth once for 1 dose, Disp: 2 tablet, Rfl: 0    hydrocortisone 2 5 % cream, Apply topically 4 (four) times a day as needed for rash, Disp: 30 g, Rfl: 0    nitrofurantoin (MACROBID) 100 mg capsule, Take 1 capsule (100 mg total) by mouth 2 (two) times a day for 7 days, Disp: 14 capsule, Rfl: 0    phenazopyridine (PYRIDIUM) 200 mg tablet, Take 1 tablet (200 mg total) by mouth 3 (three) times a day with meals, Disp: 12 tablet, Rfl: 0  No current facility-administered medications for this visit  Current Allergies     Allergies as of 01/27/2020    (No Known Allergies)              No past medical history on file  No past surgical history on file  Family History   Problem Relation Age of Onset    Hypertension Mother     Benign prostatic hyperplasia Father     Stroke Maternal Grandmother     No Known Problems Sister     Endometriosis Sister          Medications have been verified  The following portions of the patient's history were reviewed and updated as appropriate: allergies, current medications, past family history, past medical history, past social history, past surgical history and problem list     Objective   /85 (BP Location: Left arm, Patient Position: Sitting, Cuff Size: Standard)   Pulse 77   Temp 98 °F (36 7 °C) (Tympanic)   Resp 20   Ht 5' 3" (1 6 m)   Wt 54 5 kg (120 lb 3 2 oz)   LMP 01/06/2020 (Exact Date)   SpO2 100%   BMI 21 29 kg/m²      Physical Exam     Physical Exam   Constitutional: She appears well-developed and well-nourished  No distress  Pulmonary/Chest: Effort normal and breath sounds normal  No stridor  She has no decreased breath sounds  She has no wheezes  Abdominal: Soft  Normal appearance and bowel sounds are normal  There is tenderness in the suprapubic area  There is no rigidity, no rebound, no guarding and no CVA tenderness     Burning with urination, denies vaginal discharge today,        ARTHUR Travis

## 2020-01-28 NOTE — PATIENT INSTRUCTIONS
Urine dip was positive for infection today and sent for a culture,   Call in two to three days for urine culture results    Don't wear contacts when taking AZO OTC or pyridium perscription due to side effects  Increased fluids  Take meds as directed     Empty bladder often  Follow up with pcp    Call in 2-3 days for results of culture and STD culture results  Macrobid start in the morning    If symptoms get worse, go to ER for further evaluation  Urinary Tract Infection in Women   WHAT YOU NEED TO KNOW:   A urinary tract infection (UTI) is caused by bacteria that get inside your urinary tract  Most bacteria that enter your urinary tract come out when you urinate  If the bacteria stay in your urinary tract, you may get an infection  Your urinary tract includes your kidneys, ureters, bladder, and urethra  Urine is made in your kidneys, and it flows from the ureters to the bladder  Urine leaves the bladder through the urethra  A UTI is more common in your lower urinary tract, which includes your bladder and urethra  DISCHARGE INSTRUCTIONS:   Return to the emergency department if:   · You are urinating very little or not at all  · You have a high fever with shaking chills  · You have side or back pain that gets worse  Contact your healthcare provider if:   · You have a fever  · You do not feel better after 2 days of taking antibiotics  · You are vomiting  · You have questions or concerns about your condition or care  Medicines:   · Antibiotics  help fight a bacterial infection  · Medicines  may be given to decrease pain and burning when you urinate  They will also help decrease the feeling that you need to urinate often  These medicines will make your urine orange or red  · Take your medicine as directed  Contact your healthcare provider if you think your medicine is not helping or if you have side effects  Tell him or her if you are allergic to any medicine   Keep a list of the medicines, vitamins, and herbs you take  Include the amounts, and when and why you take them  Bring the list or the pill bottles to follow-up visits  Carry your medicine list with you in case of an emergency  Follow up with your healthcare provider as directed:  Write down your questions so you remember to ask them during your visits  Prevent another UTI:   · Empty your bladder often  Urinate and empty your bladder as soon as you feel the need  Do not hold your urine for long periods of time  · Wipe from front to back after you urinate or have a bowel movement  This will help prevent germs from getting into your urinary tract through your urethra  · Drink liquids as directed  Ask how much liquid to drink each day and which liquids are best for you  You may need to drink more liquids than usual to help flush out the bacteria  Do not drink alcohol, caffeine, or citrus juices  These can irritate your bladder and increase your symptoms  Your healthcare provider may recommend cranberry juice to help prevent a UTI  · Urinate after you have sex  This can help flush out bacteria passed during sex  · Do not douche or use feminine deodorants  These can change the chemical balance in your vagina  · Change sanitary pads or tampons often  This will help prevent germs from getting into your urinary tract  · Do pelvic muscle exercises often  Pelvic muscle exercises may help you start and stop urinating  Strong pelvic muscles may help you empty your bladder easier  Squeeze these muscles tightly for 5 seconds like you are trying to hold back urine  Then relax for 5 seconds  Gradually work up to squeezing for 10 seconds  Do 3 sets of 15 repetitions a day, or as directed  © 2017 [address] [employer] Information is for End User's use only and may not be sold, redistributed or otherwise used for commercial purposes   All illustrations and images included in CareNotes® are the copyrighted property of A  [employer] M [employer] Inc  or [employer]  The above information is an  only  It is not intended as medical advice for individual conditions or treatments  Talk to your doctor, nurse or pharmacist before following any medical regimen to see if it is safe and effective for you

## 2020-01-29 LAB
C TRACH DNA SPEC QL NAA+PROBE: NEGATIVE
N GONORRHOEA DNA SPEC QL NAA+PROBE: NEGATIVE

## 2020-01-30 ENCOUNTER — TELEPHONE (OUTPATIENT)
Dept: URGENT CARE | Age: 30
End: 2020-01-30

## 2020-01-30 LAB — BACTERIA UR CULT: ABNORMAL

## 2020-01-30 NOTE — TELEPHONE ENCOUNTER
Spoke with pt and aware that her results for GC/Chylamydia are negative and that she was positive for UTI and was put on macrobid at time of visit  Continue taking the meds as directed and follow up as discussed at visit  Pt had no questions at this time and verbalized understanding of all instructions     ARTHUR Haley

## 2020-02-07 DIAGNOSIS — N76.0 BACTERIAL VAGINITIS: Primary | ICD-10-CM

## 2020-02-07 DIAGNOSIS — B96.89 BACTERIAL VAGINITIS: Primary | ICD-10-CM

## 2020-02-07 RX ORDER — METRONIDAZOLE 500 MG/1
500 TABLET ORAL EVERY 12 HOURS SCHEDULED
Qty: 14 TABLET | Refills: 0 | Status: SHIPPED | OUTPATIENT
Start: 2020-02-07 | End: 2020-02-14

## 2020-06-01 ENCOUNTER — TELEMEDICINE (OUTPATIENT)
Dept: GYNECOLOGY | Facility: CLINIC | Age: 30
End: 2020-06-01
Payer: COMMERCIAL

## 2020-06-01 VITALS — BODY MASS INDEX: 21.97 KG/M2 | WEIGHT: 124 LBS | HEIGHT: 63 IN

## 2020-06-01 DIAGNOSIS — Z72.51 UNPROTECTED SEX: ICD-10-CM

## 2020-06-01 DIAGNOSIS — Z30.015 ENCOUNTER FOR INITIAL PRESCRIPTION OF VAGINAL RING HORMONAL CONTRACEPTIVE: Primary | ICD-10-CM

## 2020-06-01 PROCEDURE — 99214 OFFICE O/P EST MOD 30 MIN: CPT | Performed by: NURSE PRACTITIONER

## 2020-06-01 RX ORDER — ETONOGESTREL AND ETHINYL ESTRADIOL 11.7; 2.7 MG/1; MG/1
INSERT, EXTENDED RELEASE VAGINAL
Qty: 28 EACH | Refills: 2 | Status: SHIPPED | OUTPATIENT
Start: 2020-06-01 | End: 2020-07-27

## 2020-06-01 RX ORDER — DIPHENOXYLATE HYDROCHLORIDE AND ATROPINE SULFATE 2.5; .025 MG/1; MG/1
1 TABLET ORAL DAILY
COMMUNITY
End: 2020-07-22

## 2020-06-01 RX ORDER — LEVONORGESTREL 1.5 MG/1
1.5 TABLET ORAL ONCE
Qty: 1 TABLET | Refills: 0 | Status: SHIPPED | OUTPATIENT
Start: 2020-06-01 | End: 2020-06-01

## 2020-06-23 ENCOUNTER — TELEPHONE (OUTPATIENT)
Dept: GYNECOLOGY | Facility: CLINIC | Age: 30
End: 2020-06-23

## 2020-07-22 ENCOUNTER — OFFICE VISIT (OUTPATIENT)
Dept: FAMILY MEDICINE CLINIC | Facility: CLINIC | Age: 30
End: 2020-07-22
Payer: COMMERCIAL

## 2020-07-22 VITALS
WEIGHT: 124 LBS | SYSTOLIC BLOOD PRESSURE: 118 MMHG | DIASTOLIC BLOOD PRESSURE: 62 MMHG | TEMPERATURE: 97 F | BODY MASS INDEX: 21.97 KG/M2

## 2020-07-22 DIAGNOSIS — R53.83 FATIGUE, UNSPECIFIED TYPE: ICD-10-CM

## 2020-07-22 DIAGNOSIS — L81.9 HYPERPIGMENTATION OF SKIN: ICD-10-CM

## 2020-07-22 DIAGNOSIS — Z13.1 SCREENING FOR DIABETES MELLITUS: ICD-10-CM

## 2020-07-22 DIAGNOSIS — Z13.6 SCREENING FOR CARDIOVASCULAR CONDITION: ICD-10-CM

## 2020-07-22 DIAGNOSIS — L70.0 ACNE VULGARIS: ICD-10-CM

## 2020-07-22 DIAGNOSIS — Z00.00 ANNUAL PHYSICAL EXAM: Primary | ICD-10-CM

## 2020-07-22 LAB
ALBUMIN SERPL BCP-MCNC: 3.7 G/DL (ref 3.5–5)
ALP SERPL-CCNC: 45 U/L (ref 46–116)
ALT SERPL W P-5'-P-CCNC: 14 U/L (ref 12–78)
ANION GAP SERPL CALCULATED.3IONS-SCNC: 7 MMOL/L (ref 4–13)
AST SERPL W P-5'-P-CCNC: 13 U/L (ref 5–45)
BILIRUB SERPL-MCNC: 0.85 MG/DL (ref 0.2–1)
BUN SERPL-MCNC: 8 MG/DL (ref 5–25)
CALCIUM SERPL-MCNC: 9 MG/DL (ref 8.3–10.1)
CHLORIDE SERPL-SCNC: 106 MMOL/L (ref 100–108)
CHOLEST SERPL-MCNC: 163 MG/DL (ref 50–200)
CO2 SERPL-SCNC: 26 MMOL/L (ref 21–32)
CREAT SERPL-MCNC: 0.76 MG/DL (ref 0.6–1.3)
ERYTHROCYTE [DISTWIDTH] IN BLOOD BY AUTOMATED COUNT: 12.7 % (ref 11.6–15.1)
GFR SERPL CREATININE-BSD FRML MDRD: 123 ML/MIN/1.73SQ M
GLUCOSE SERPL-MCNC: 81 MG/DL (ref 65–140)
HCT VFR BLD AUTO: 39.1 % (ref 34.8–46.1)
HDLC SERPL-MCNC: 51 MG/DL
HGB BLD-MCNC: 12.4 G/DL (ref 11.5–15.4)
LDLC SERPL CALC-MCNC: 91 MG/DL (ref 0–100)
MCH RBC QN AUTO: 28.3 PG (ref 26.8–34.3)
MCHC RBC AUTO-ENTMCNC: 31.7 G/DL (ref 31.4–37.4)
MCV RBC AUTO: 89 FL (ref 82–98)
NONHDLC SERPL-MCNC: 112 MG/DL
PLATELET # BLD AUTO: 277 THOUSANDS/UL (ref 149–390)
PMV BLD AUTO: 10.4 FL (ref 8.9–12.7)
POTASSIUM SERPL-SCNC: 3.9 MMOL/L (ref 3.5–5.3)
PROT SERPL-MCNC: 7.7 G/DL (ref 6.4–8.2)
RBC # BLD AUTO: 4.38 MILLION/UL (ref 3.81–5.12)
SODIUM SERPL-SCNC: 139 MMOL/L (ref 136–145)
TRIGL SERPL-MCNC: 104 MG/DL
WBC # BLD AUTO: 5.18 THOUSAND/UL (ref 4.31–10.16)

## 2020-07-22 PROCEDURE — 80053 COMPREHEN METABOLIC PANEL: CPT | Performed by: FAMILY MEDICINE

## 2020-07-22 PROCEDURE — 85027 COMPLETE CBC AUTOMATED: CPT | Performed by: FAMILY MEDICINE

## 2020-07-22 PROCEDURE — 36415 COLL VENOUS BLD VENIPUNCTURE: CPT | Performed by: FAMILY MEDICINE

## 2020-07-22 PROCEDURE — 99395 PREV VISIT EST AGE 18-39: CPT | Performed by: FAMILY MEDICINE

## 2020-07-22 PROCEDURE — 80061 LIPID PANEL: CPT | Performed by: FAMILY MEDICINE

## 2020-07-22 RX ORDER — CLINDAMYCIN PHOSPHATE 10 MG/G
GEL TOPICAL 2 TIMES DAILY
Qty: 30 G | Refills: 3 | Status: SHIPPED | OUTPATIENT
Start: 2020-07-22

## 2020-07-22 NOTE — PATIENT INSTRUCTIONS

## 2020-07-22 NOTE — PROGRESS NOTES
Assessment/Plan:    No problem-specific Assessment & Plan notes found for this encounter  There are no diagnoses linked to this encounter  Subjective:   Chief Complaint   Patient presents with    Annual Exam    Acne          Patient ID: Diamond Rocha is a 30 y o  female      HPI    The following portions of the patient's history were reviewed and updated as appropriate: allergies, current medications, past family history, past medical history, past social history, past surgical history and problem list     Review of Systems      Objective:      /62   Temp (!) 97 °F (36 1 °C)   Wt 56 2 kg (124 lb)   BMI 21 97 kg/m²          Physical Exam

## 2020-07-22 NOTE — PROGRESS NOTES
[address]    NAME: Rere Fletcher  AGE: 30 y o  SEX: female  : 1990     DATE: 2020     Assessment and Plan:     Problem List Items Addressed This Visit        Musculoskeletal and Integument    Acne vulgaris     Add cleocin gel to her wash         Relevant Medications    clindamycin (CLINDAGEL) 1 % gel    Other Relevant Orders    Ambulatory referral to Dermatology    Hyperpigmentation of skin     Refer to Dermatology         Relevant Medications    clindamycin (CLINDAGEL) 1 % gel    Other Relevant Orders    Ambulatory referral to Dermatology       Other    Annual physical exam - Primary     Continue iwht exercise Patient to also restart a daily multiple vitamin Will do screening test for lipids comprehensive metabolic profile Patient diet reveiwed also patient to [facility] to see GYN         Fatigue     Add multiple vitamin patient to have CBC done          Relevant Orders    Comprehensive metabolic panel    CBC and Platelet      Other Visit Diagnoses     Screening for diabetes mellitus        Relevant Orders    Comprehensive metabolic panel    Screening for cardiovascular condition        Relevant Orders    Lipid panel          Immunizations and preventive care screenings were discussed with patient today  Appropriate education was printed on patient's after visit summary  Counseling:  Alcohol/drug use: discussed moderation in alcohol intake, the recommendations for healthy alcohol use, and avoidance of illicit drug use  Dental Health: discussed importance of regular tooth brushing, flossing, and dental visits  Injury prevention: discussed safety/seat belts, safety helmets, smoke detectors, carbon dioxide detectors, and smoking near bedding or upholstery    Sexual health: discussed sexually transmitted diseases, partner selection, use of condoms, avoidance of unintended pregnancy, and contraceptive alternatives  · Exercise: the importance of regular exercise/physical activity was discussed  Recommend exercise 3-5 times per week for at least 30 minutes  Return in 1 year (on 7/22/2021)  Chief Complaint:     Chief Complaint   Patient presents with    Annual Exam    Acne      History of Present Illness:     Adult Annual Physical   Patient here for a comprehensive physical exam  The patient reports acne  Diet and Physical Activity  · Diet/Nutrition: well balanced diet  · Exercise: moderate cardiovascular exercise and 3-4 times a week on average  Depression Screening  PHQ-9 Depression Screening    PHQ-9:    Frequency of the following problems over the past two weeks:       Little interest or pleasure in doing things:  0 - not at all  Feeling down, depressed, or hopeless:  0 - not at all  PHQ-2 Score:  0       General Health  · Sleep: sleeps well and gets 7-8 hours of sleep on average  · Hearing: normal - bilateral   · Vision: no vision problems  · Dental: no dental visits for >1 year and brushes teeth twice daily  /GYN Health  · Last menstrual period: June 26, 2020  · Contraceptive method: nuvo ring  · History of STDs?: no      Review of Systems:     Review of Systems   Constitutional: Positive for fatigue  Negative for fever and unexpected weight change  Patient has had rober issues with feeling fatigued and is concerned about anemia however 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   HENT: Negative for congestion, sinus pain and trouble swallowing  Eyes: Negative for discharge and visual disturbance  Respiratory: Negative for cough, chest tightness, shortness of breath and wheezing  Cardiovascular: Negative for chest pain, palpitations and leg swelling  Gastrointestinal: Negative for abdominal pain, blood in stool, constipation, diarrhea, nausea and vomiting  Genitourinary: Negative for difficulty urinating, dysuria, frequency and hematuria  Musculoskeletal: Negative for arthralgias, gait problem and joint swelling  Skin: Negative for rash and wound  Acne since starting the birth control and using the mask   Allergic/Immunologic: Negative for environmental allergies and food allergies  Neurological: Negative for dizziness, syncope, weakness, numbness and headaches  Hematological: Negative for adenopathy  Does not bruise/bleed easily  Psychiatric/Behavioral: Negative for confusion, decreased concentration and sleep disturbance  The patient is not nervous/anxious  Past Medical History:     No past medical history on file  Past Surgical History:     No past surgical history on file     Social History:     E-Cigarette/Vaping    E-Cigarette Use Never User      E-Cigarette/Vaping Substances    Nicotine No     THC No     CBD No     Flavoring No     Other No     Unknown No      Social History     Socioeconomic History    Marital status: Single     Spouse name: None    Number of children: None    Years of education: None    Highest education level: None   Occupational History    None   Social Needs    Financial resource strain: None    Food insecurity:     Worry: None     Inability: None    Transportation needs:     Medical: None     Non-medical: None   Tobacco Use    Smoking status: Never Smoker    Smokeless tobacco: Never Used   Substance and Sexual Activity    Alcohol use: Yes     Frequency: Monthly or less     Drinks per session: 1 or 2     Binge frequency: Never    Drug use: No    Sexual activity: Yes     Partners: Male     Birth control/protection: Inserts   Lifestyle    Physical activity:     Days per week: None     Minutes per session: None    Stress: None   Relationships    Social connections:     Talks on phone: None     Gets together: None     Attends Zoroastrian service: None     Active member of club or organization: None     Attends meetings of clubs or organizations: None     Relationship status: None  Intimate partner violence:     Fear of current or ex partner: None     Emotionally abused: None     Physically abused: None     Forced sexual activity: None   Other Topics Concern    None   Social History Narrative    None      Family History:     Family History   Problem Relation Age of Onset    Hypertension Mother     Benign prostatic hyperplasia Father     Stroke Maternal Grandmother     No Known Problems Sister     No Known Problems Brother     Endometriosis Sister       Current Medications:     Current Outpatient Medications   Medication Sig Dispense Refill    etonogestrel-ethinyl estradiol (NUVARING) 0 12-0 015 MG/24HR vaginal ring Insert vaginally and leave in place for 3 consecutive weeks, then remove for 1 week  28 each 2    clindamycin (CLINDAGEL) 1 % gel Apply topically 2 (two) times a day 30 g 3     No current facility-administered medications for this visit  Allergies:     No Known Allergies   Physical Exam:     /62   Temp (!) 97 °F (36 1 °C)   Wt 56 2 kg (124 lb)   BMI 21 97 kg/m²     Physical Exam   Constitutional: She is oriented to person, place, and time  She appears well-developed and well-nourished  HENT:   Head: Normocephalic and atraumatic  Right Ear: Hearing, tympanic membrane and external ear normal    Left Ear: Hearing, tympanic membrane and external ear normal    Eyes: Pupils are equal, round, and reactive to light  Conjunctivae and EOM are normal    Neck: Neck supple  No thyromegaly present  Cardiovascular: Normal rate and normal heart sounds  Pulmonary/Chest: Effort normal and breath sounds normal  She has no wheezes  She has no rales  Abdominal: Soft  Bowel sounds are normal  She exhibits no distension  There is no tenderness  Musculoskeletal: She exhibits no edema or tenderness  Lymphadenopathy:     She has no cervical adenopathy  Neurological: She is alert and oriented to person, place, and time  No cranial nerve deficit   Coordination normal    Skin: Skin is warm and dry  No rash noted  Closed comedones along the jaw line there is also some hyperpigmentation noted from where previous acne was Patient washes BID with salycilic acid   Psychiatric: She has a normal mood and affect   Her behavior is normal  Judgment and thought content normal        Christian Mackenzie DO   [id number]

## 2020-07-22 NOTE — ASSESSMENT & PLAN NOTE
Continue iwht exercise Patient to also restart a daily multiple vitamin Will do screening test for lipids comprehensive metabolic profile Patient diet reveiwed also patient to [facility] to see GYN

## 2020-07-27 ENCOUNTER — TELEPHONE (OUTPATIENT)
Dept: GYNECOLOGY | Facility: CLINIC | Age: 30
End: 2020-07-27

## 2020-07-27 DIAGNOSIS — Z30.015 ENCOUNTER FOR INITIAL PRESCRIPTION OF VAGINAL RING HORMONAL CONTRACEPTIVE: Primary | ICD-10-CM

## 2020-07-27 RX ORDER — ETONOGESTREL AND ETHINYL ESTRADIOL 11.7; 2.7 MG/1; MG/1
INSERT, EXTENDED RELEASE VAGINAL
Qty: 1 EACH | Refills: 4 | Status: SHIPPED | OUTPATIENT
Start: 2020-07-27 | End: 2020-10-21 | Stop reason: SDUPTHER

## 2020-07-27 NOTE — TELEPHONE ENCOUNTER
That was a virtual 1st visit which means she was not examined  She really should have an in-person exam in December  There is no documentation of any [facility] physician seeing her within the past several years  In Susan's note it mentions Dr Keyona Albarado but there is no record of this unless it is a different Dr Keyona Albarado  I placed NuvaRing script with 4 refills in [address]  She should make an appointment with Susan in December

## 2020-07-27 NOTE — TELEPHONE ENCOUNTER
Pt had a virtual with Susan and was sent in refills for nuvaring but the pharmacy she was having it sent to told her she can not get it filled there she has to go to a [facility]  Can you send in [facility] refills [facility] for [facility]

## 2020-09-10 DIAGNOSIS — Z11.59 ENCOUNTER FOR SCREENING FOR OTHER VIRAL DISEASES: Primary | ICD-10-CM

## 2020-09-11 DIAGNOSIS — Z11.59 ENCOUNTER FOR SCREENING FOR OTHER VIRAL DISEASES: ICD-10-CM

## 2020-09-11 PROCEDURE — U0003 INFECTIOUS AGENT DETECTION BY NUCLEIC ACID (DNA OR RNA); SEVERE ACUTE RESPIRATORY SYNDROME CORONAVIRUS 2 (SARS-COV-2) (CORONAVIRUS DISEASE [COVID-19]), AMPLIFIED PROBE TECHNIQUE, MAKING USE OF HIGH THROUGHPUT TECHNOLOGIES AS DESCRIBED BY CMS-2020-01-R: HCPCS | Performed by: FAMILY MEDICINE

## 2020-09-12 LAB — SARS-COV-2 RNA SPEC QL NAA+PROBE: NOT DETECTED

## 2020-10-16 DIAGNOSIS — Z11.59 ENCOUNTER FOR SCREENING FOR OTHER VIRAL DISEASES: ICD-10-CM

## 2020-10-16 DIAGNOSIS — Z11.59 ENCOUNTER FOR SCREENING FOR OTHER VIRAL DISEASES: Primary | ICD-10-CM

## 2020-10-16 PROCEDURE — U0003 INFECTIOUS AGENT DETECTION BY NUCLEIC ACID (DNA OR RNA); SEVERE ACUTE RESPIRATORY SYNDROME CORONAVIRUS 2 (SARS-COV-2) (CORONAVIRUS DISEASE [COVID-19]), AMPLIFIED PROBE TECHNIQUE, MAKING USE OF HIGH THROUGHPUT TECHNOLOGIES AS DESCRIBED BY CMS-2020-01-R: HCPCS | Performed by: FAMILY MEDICINE

## 2020-10-17 LAB — SARS-COV-2 RNA SPEC QL NAA+PROBE: NOT DETECTED

## 2020-10-21 ENCOUNTER — ANNUAL EXAM (OUTPATIENT)
Dept: GYNECOLOGY | Facility: CLINIC | Age: 30
End: 2020-10-21
Payer: COMMERCIAL

## 2020-10-21 VITALS
SYSTOLIC BLOOD PRESSURE: 100 MMHG | BODY MASS INDEX: 22.2 KG/M2 | DIASTOLIC BLOOD PRESSURE: 60 MMHG | WEIGHT: 130 LBS | HEIGHT: 64 IN | TEMPERATURE: 97.8 F

## 2020-10-21 DIAGNOSIS — Z30.015 ENCOUNTER FOR INITIAL PRESCRIPTION OF VAGINAL RING HORMONAL CONTRACEPTIVE: ICD-10-CM

## 2020-10-21 DIAGNOSIS — Z30.49 ENCOUNTER FOR SURVEILLANCE OF NUVARING: ICD-10-CM

## 2020-10-21 DIAGNOSIS — Z01.419 ENCOUNTER FOR ANNUAL ROUTINE GYNECOLOGICAL EXAMINATION: Primary | ICD-10-CM

## 2020-10-21 PROCEDURE — 99395 PREV VISIT EST AGE 18-39: CPT | Performed by: OBSTETRICS & GYNECOLOGY

## 2020-10-21 RX ORDER — ETONOGESTREL AND ETHINYL ESTRADIOL 11.7; 2.7 MG/1; MG/1
INSERT, EXTENDED RELEASE VAGINAL
Qty: 3 EACH | Refills: 3 | Status: SHIPPED | OUTPATIENT
Start: 2020-10-21 | End: 2021-02-08

## 2020-11-24 ENCOUNTER — TELEMEDICINE (OUTPATIENT)
Dept: FAMILY MEDICINE CLINIC | Facility: CLINIC | Age: 30
End: 2020-11-24
Payer: COMMERCIAL

## 2020-11-24 DIAGNOSIS — B34.9 VIRAL INFECTION, UNSPECIFIED: ICD-10-CM

## 2020-11-24 DIAGNOSIS — Z20.822 EXPOSURE TO COVID-19 VIRUS: ICD-10-CM

## 2020-11-24 PROCEDURE — 99213 OFFICE O/P EST LOW 20 MIN: CPT | Performed by: FAMILY MEDICINE

## 2020-11-24 PROCEDURE — U0003 INFECTIOUS AGENT DETECTION BY NUCLEIC ACID (DNA OR RNA); SEVERE ACUTE RESPIRATORY SYNDROME CORONAVIRUS 2 (SARS-COV-2) (CORONAVIRUS DISEASE [COVID-19]), AMPLIFIED PROBE TECHNIQUE, MAKING USE OF HIGH THROUGHPUT TECHNOLOGIES AS DESCRIBED BY CMS-2020-01-R: HCPCS | Performed by: FAMILY MEDICINE

## 2020-11-25 LAB — SARS-COV-2 RNA SPEC QL NAA+PROBE: NOT DETECTED

## 2021-01-13 DIAGNOSIS — Z11.9 ENCOUNTER FOR SCREENING FOR INFECTIOUS AND PARASITIC DISEASES, UNSPECIFIED: Primary | ICD-10-CM

## 2021-01-16 DIAGNOSIS — Z11.9 ENCOUNTER FOR SCREENING FOR INFECTIOUS AND PARASITIC DISEASES, UNSPECIFIED: ICD-10-CM

## 2021-01-16 PROCEDURE — U0003 INFECTIOUS AGENT DETECTION BY NUCLEIC ACID (DNA OR RNA); SEVERE ACUTE RESPIRATORY SYNDROME CORONAVIRUS 2 (SARS-COV-2) (CORONAVIRUS DISEASE [COVID-19]), AMPLIFIED PROBE TECHNIQUE, MAKING USE OF HIGH THROUGHPUT TECHNOLOGIES AS DESCRIBED BY CMS-2020-01-R: HCPCS | Performed by: FAMILY MEDICINE

## 2021-01-19 LAB — SARS-COV-2 RNA SPEC QL NAA+PROBE: NOT DETECTED

## 2021-02-08 ENCOUNTER — HOSPITAL ENCOUNTER (OUTPATIENT)
Dept: NON INVASIVE DIAGNOSTICS | Facility: HOSPITAL | Age: 31
Discharge: HOME | End: 2021-02-08
Payer: COMMERCIAL

## 2021-02-08 ENCOUNTER — APPOINTMENT (OUTPATIENT)
Dept: LAB | Facility: CLINIC | Age: 31
End: 2021-02-08
Payer: COMMERCIAL

## 2021-02-08 DIAGNOSIS — I80.9 THROMBOPHLEBITIS: ICD-10-CM

## 2021-02-08 DIAGNOSIS — I80.9 THROMBOPHLEBITIS: Primary | ICD-10-CM

## 2021-02-08 DIAGNOSIS — M79.89 SWELLING OF UPPER ARM: ICD-10-CM

## 2021-02-08 DIAGNOSIS — I82.A11 ACUTE DEEP VEIN THROMBOSIS (DVT) OF AXILLARY VEIN OF RIGHT UPPER EXTREMITY (HCC): ICD-10-CM

## 2021-02-08 DIAGNOSIS — M79.89 SWELLING OF UPPER ARM: Primary | ICD-10-CM

## 2021-02-08 LAB
ALBUMIN SERPL BCP-MCNC: 3.9 G/DL (ref 3.5–5)
ALP SERPL-CCNC: 77 U/L (ref 46–116)
ALT SERPL W P-5'-P-CCNC: 19 U/L (ref 12–78)
ANION GAP SERPL CALCULATED.3IONS-SCNC: 6 MMOL/L (ref 4–13)
AST SERPL W P-5'-P-CCNC: 17 U/L (ref 5–45)
B-HCG SERPL-ACNC: <2 MIU/ML
BASOPHILS # BLD AUTO: 0.07 THOUSANDS/ΜL (ref 0–0.1)
BASOPHILS NFR BLD AUTO: 1 % (ref 0–1)
BILIRUB SERPL-MCNC: 0.55 MG/DL (ref 0.2–1)
BUN SERPL-MCNC: 16 MG/DL (ref 5–25)
CALCIUM SERPL-MCNC: 9.8 MG/DL (ref 8.3–10.1)
CHLORIDE SERPL-SCNC: 106 MMOL/L (ref 100–108)
CO2 SERPL-SCNC: 28 MMOL/L (ref 21–32)
CREAT SERPL-MCNC: 0.86 MG/DL (ref 0.6–1.3)
D DIMER PPP FEU-MCNC: 4.85 UG/ML FEU
EOSINOPHIL # BLD AUTO: 0.59 THOUSAND/ΜL (ref 0–0.61)
EOSINOPHIL NFR BLD AUTO: 6 % (ref 0–6)
ERYTHROCYTE [DISTWIDTH] IN BLOOD BY AUTOMATED COUNT: 13.2 % (ref 11.6–15.1)
GFR SERPL CREATININE-BSD FRML MDRD: 106 ML/MIN/1.73SQ M
GLUCOSE P FAST SERPL-MCNC: 72 MG/DL (ref 65–99)
HCT VFR BLD AUTO: 40.4 % (ref 34.8–46.1)
HGB BLD-MCNC: 12.4 G/DL (ref 11.5–15.4)
IMM GRANULOCYTES # BLD AUTO: 0.04 THOUSAND/UL (ref 0–0.2)
IMM GRANULOCYTES NFR BLD AUTO: 0 % (ref 0–2)
LYMPHOCYTES # BLD AUTO: 2.03 THOUSANDS/ΜL (ref 0.6–4.47)
LYMPHOCYTES NFR BLD AUTO: 20 % (ref 14–44)
MCH RBC QN AUTO: 26.4 PG (ref 26.8–34.3)
MCHC RBC AUTO-ENTMCNC: 30.7 G/DL (ref 31.4–37.4)
MCV RBC AUTO: 86 FL (ref 82–98)
MONOCYTES # BLD AUTO: 0.74 THOUSAND/ΜL (ref 0.17–1.22)
MONOCYTES NFR BLD AUTO: 7 % (ref 4–12)
NEUTROPHILS # BLD AUTO: 6.6 THOUSANDS/ΜL (ref 1.85–7.62)
NEUTS SEG NFR BLD AUTO: 66 % (ref 43–75)
NRBC BLD AUTO-RTO: 0 /100 WBCS
PLATELET # BLD AUTO: 435 THOUSANDS/UL (ref 149–390)
PMV BLD AUTO: 9.6 FL (ref 8.9–12.7)
POTASSIUM SERPL-SCNC: 4.1 MMOL/L (ref 3.5–5.3)
PROT SERPL-MCNC: 8.6 G/DL (ref 6.4–8.2)
RBC # BLD AUTO: 4.69 MILLION/UL (ref 3.81–5.12)
SODIUM SERPL-SCNC: 140 MMOL/L (ref 136–145)
WBC # BLD AUTO: 10.07 THOUSAND/UL (ref 4.31–10.16)

## 2021-02-08 PROCEDURE — 81241 F5 GENE: CPT

## 2021-02-08 PROCEDURE — 86147 CARDIOLIPIN ANTIBODY EA IG: CPT

## 2021-02-08 PROCEDURE — 85705 THROMBOPLASTIN INHIBITION: CPT

## 2021-02-08 PROCEDURE — 85300 ANTITHROMBIN III ACTIVITY: CPT

## 2021-02-08 PROCEDURE — 85305 CLOT INHIBIT PROT S TOTAL: CPT

## 2021-02-08 PROCEDURE — 85613 RUSSELL VIPER VENOM DILUTED: CPT

## 2021-02-08 PROCEDURE — 93971 EXTREMITY STUDY: CPT

## 2021-02-08 PROCEDURE — 85306 CLOT INHIBIT PROT S FREE: CPT

## 2021-02-08 PROCEDURE — 81240 F2 GENE: CPT

## 2021-02-08 PROCEDURE — 85670 THROMBIN TIME PLASMA: CPT

## 2021-02-08 PROCEDURE — 85025 COMPLETE CBC W/AUTO DIFF WBC: CPT

## 2021-02-08 PROCEDURE — 86146 BETA-2 GLYCOPROTEIN ANTIBODY: CPT

## 2021-02-08 PROCEDURE — 85303 CLOT INHIBIT PROT C ACTIVITY: CPT

## 2021-02-08 PROCEDURE — 93971 EXTREMITY STUDY: CPT | Performed by: SURGERY

## 2021-02-08 PROCEDURE — 84702 CHORIONIC GONADOTROPIN TEST: CPT

## 2021-02-08 PROCEDURE — 85379 FIBRIN DEGRADATION QUANT: CPT

## 2021-02-08 PROCEDURE — 36415 COLL VENOUS BLD VENIPUNCTURE: CPT

## 2021-02-08 PROCEDURE — 80053 COMPREHEN METABOLIC PANEL: CPT

## 2021-02-08 PROCEDURE — 85732 THROMBOPLASTIN TIME PARTIAL: CPT

## 2021-02-08 RX ORDER — ACETAMINOPHEN AND CODEINE PHOSPHATE 300; 15 MG/1; MG/1
1 TABLET ORAL EVERY 4 HOURS PRN
Qty: 30 TABLET | Refills: 0 | Status: SHIPPED | OUTPATIENT
Start: 2021-02-08

## 2021-02-08 NOTE — PROGRESS NOTES
Patient was seen today to review her U/S findings  Vijaya Sheth contacted me via [employer] and let me know of the following findings : DVT in the right Axillary, basilic, cephalic, and median cubital vein  Official note was not in [facility] yet  Patient did a office urine Bhcg as the blood work from this morning was still pending  Office BHCG was negative  A/P:   Upper extremity proximal DVT  - DVT work up was already ordered, follow up with results  - Consult will be placed to hematology onc    - Contacted pharmacy and patient will begin starter pack of Eliquis  - Patient will contact and follow up with her PCP  - Though unlikely, patient is given ER precautions if she feels shortness of breath and or chest pain to be evaluated for PE    - As patient will be on Eliquis she can not take NSAIDS for pain  She will take tylenol  I rx Tylenol with codeine prn

## 2021-02-08 NOTE — PROGRESS NOTES
Subjective: Patient is a very pleasant 35year old female with no significant PMHx, Reports pain in right AC  Pain started Friday, patient denies any trauma, IV, or vaccines in that arm  Patient denies any cuts or infection on the right arm or breast  Area is very tender and effects her ability to move her arm  movement makes it worse, nothing makes it better  Objective:  R Antecubital fossa is tender, swollen, and warm to the touch  lesion follows a venous track up the medial aspect of right arm  No adenopathy of the right axilla     A/P:    Thrombophlebitis verses cellulitis verses upper extremity DVT  1  Will get stat ultrasound of right arm  2  Will get CBC, CMP, D-dimer, bHCG  3  Thrombosis panel   4    Depending on ultrasound results will start patient on antibiotics

## 2021-02-09 ENCOUNTER — TELEPHONE (OUTPATIENT)
Dept: SURGICAL ONCOLOGY | Facility: CLINIC | Age: 31
End: 2021-02-09

## 2021-02-09 LAB
CARDIOLIPIN IGA SER IA-ACNC: <9 APL U/ML (ref 0–11)
CARDIOLIPIN IGG SER IA-ACNC: <9 GPL U/ML (ref 0–14)
CARDIOLIPIN IGM SER IA-ACNC: 14 MPL U/ML (ref 0–12)
DEPRECATED AT III PPP: 93 % OF NORMAL (ref 92–136)
PROT S ACT/NOR PPP: 28 % (ref 57–157)
PROT S PPP-ACNC: 39 % (ref 60–150)

## 2021-02-09 NOTE — TELEPHONE ENCOUNTER
New Patient Encounter    New Patient Intake Form   Patient Details:  Reuben Cardenas  1990  [id number]    Background Information:  [address] starts by opening a telephone encounter and gathering the following information   Who is calling to schedule? If not self, relationship to patient? self   Referring Provider pcp   What is the diagnosis? dvt   Is this diagnosis confirmed? Yes   When was the diagnosis? 2/2021   Is there a confirmed diagnosis from a biopsy/tissue reviewed by pathology? Were outside slides requested? NA   Is patient aware of diagnosis? Yes   Is there a personal history and what kind? Is there a family history and what kind? Reason for visit? New Diagnosis   Have you had any imaging or labs done? If so: when, where? yes  sl   Are records in [facility]? yes   If patient has a prior history of breast cancer were old records obtained? NA   Was the patient told to bring a disk? no   Does the patient smoke or Vape? no   If yes, how many packs or cartridges per day? Scheduling Information:   Preferred [place]:  [place]     Are there any dates/time the patient cannot be seen? Miscellaneous:    After completing the above information, please route to Financial Counselor and the appropriate Nurse Navigator for review

## 2021-02-10 DIAGNOSIS — Z11.9 ENCOUNTER FOR SCREENING FOR INFECTIOUS AND PARASITIC DISEASES, UNSPECIFIED: Primary | ICD-10-CM

## 2021-02-11 LAB
APTT SCREEN TO CONFIRM RATIO: 1.2 RATIO (ref 0–1.4)
B2 GLYCOPROT1 IGA SER-ACNC: <9 GPI IGA UNITS (ref 0–25)
B2 GLYCOPROT1 IGG SER-ACNC: <9 GPI IGG UNITS (ref 0–20)
B2 GLYCOPROT1 IGM SER-ACNC: <9 GPI IGM UNITS (ref 0–32)
CONFIRM APTT/NORMAL: 44.1 SEC (ref 0–55)
DRVVT IMM 1:2 NP PPP: 41 SEC (ref 0–40.4)
DRVVT SCREEN TO CONFIRM RATIO: 1.5 RATIO (ref 0.8–1.2)
LA PPP-IMP: ABNORMAL
PROT C AG ACT/NOR PPP IA: 97 % OF NORMAL (ref 60–150)
PROT S ACT/NOR PPP: 10 % (ref 68–108)
SCREEN APTT: 33.2 SEC (ref 0–51.9)
SCREEN DRVVT: 50.3 SEC (ref 0–47)
THROMBIN TIME: 16.8 SEC (ref 0–23)

## 2021-02-12 LAB
F2 GENE MUT ANL BLD/T: NORMAL
F5 GENE MUT ANL BLD/T: NORMAL

## 2021-03-05 DIAGNOSIS — I82.A11 ACUTE DEEP VEIN THROMBOSIS (DVT) OF AXILLARY VEIN OF RIGHT UPPER EXTREMITY (HCC): Primary | ICD-10-CM

## 2021-03-09 ENCOUNTER — IMMUNIZATIONS (OUTPATIENT)
Dept: FAMILY MEDICINE CLINIC | Facility: HOSPITAL | Age: 31
End: 2021-03-09

## 2021-03-09 DIAGNOSIS — Z23 ENCOUNTER FOR IMMUNIZATION: Primary | ICD-10-CM

## 2021-03-09 DIAGNOSIS — Z11.9 ENCOUNTER FOR SCREENING FOR INFECTIOUS AND PARASITIC DISEASES, UNSPECIFIED: Primary | ICD-10-CM

## 2021-03-09 PROCEDURE — 91300 SARS-COV-2 / COVID-19 MRNA VACCINE (PFIZER-BIONTECH) 30 MCG: CPT

## 2021-03-09 PROCEDURE — 0001A SARS-COV-2 / COVID-19 MRNA VACCINE (PFIZER-BIONTECH) 30 MCG: CPT

## 2021-03-15 DIAGNOSIS — Z11.9 ENCOUNTER FOR SCREENING FOR INFECTIOUS AND PARASITIC DISEASES, UNSPECIFIED: ICD-10-CM

## 2021-03-15 PROCEDURE — U0005 INFEC AGEN DETEC AMPLI PROBE: HCPCS | Performed by: FAMILY MEDICINE

## 2021-03-15 PROCEDURE — U0003 INFECTIOUS AGENT DETECTION BY NUCLEIC ACID (DNA OR RNA); SEVERE ACUTE RESPIRATORY SYNDROME CORONAVIRUS 2 (SARS-COV-2) (CORONAVIRUS DISEASE [COVID-19]), AMPLIFIED PROBE TECHNIQUE, MAKING USE OF HIGH THROUGHPUT TECHNOLOGIES AS DESCRIBED BY CMS-2020-01-R: HCPCS | Performed by: FAMILY MEDICINE

## 2021-03-16 LAB — SARS-COV-2 RNA RESP QL NAA+PROBE: NEGATIVE

## 2021-03-25 ENCOUNTER — TELEPHONE (OUTPATIENT)
Dept: HEMATOLOGY ONCOLOGY | Facility: CLINIC | Age: 31
End: 2021-03-25

## 2021-03-25 NOTE — TELEPHONE ENCOUNTER
Patient has 10:00 a m  consult apt tomorrow Friday 03/26/21 w/Dr Velvet Lopes at the [address] [place] [address]  Called pt and left message to call the office to do covid pre screening questionnaire, [phone]

## 2021-03-26 ENCOUNTER — CONSULT (OUTPATIENT)
Dept: HEMATOLOGY ONCOLOGY | Facility: CLINIC | Age: 31
End: 2021-03-26
Payer: COMMERCIAL

## 2021-03-26 VITALS
SYSTOLIC BLOOD PRESSURE: 132 MMHG | BODY MASS INDEX: 22.33 KG/M2 | DIASTOLIC BLOOD PRESSURE: 74 MMHG | RESPIRATION RATE: 18 BRPM | HEART RATE: 96 BPM | OXYGEN SATURATION: 97 % | HEIGHT: 64 IN | WEIGHT: 130.8 LBS | TEMPERATURE: 98.5 F

## 2021-03-26 DIAGNOSIS — I82.A11 ACUTE DEEP VEIN THROMBOSIS (DVT) OF AXILLARY VEIN OF RIGHT UPPER EXTREMITY (HCC): ICD-10-CM

## 2021-03-26 DIAGNOSIS — M79.89 SWELLING OF UPPER ARM: ICD-10-CM

## 2021-03-26 DIAGNOSIS — I82.A11 ACUTE DEEP VEIN THROMBOSIS (DVT) OF AXILLARY VEIN OF RIGHT UPPER EXTREMITY (HCC): Primary | ICD-10-CM

## 2021-03-26 PROCEDURE — 99214 OFFICE O/P EST MOD 30 MIN: CPT | Performed by: INTERNAL MEDICINE

## 2021-03-26 RX ORDER — WARFARIN SODIUM 5 MG/1
TABLET ORAL
Qty: 30 TABLET | Refills: 1 | Status: SHIPPED | OUTPATIENT
Start: 2021-03-26 | End: 2021-04-05

## 2021-03-26 NOTE — PROGRESS NOTES
Hematology/Oncology Outpatient Consult  Duglas Garcia 35 y o  female 1990 [id number]    Date:  3/26/2021    Assessment and Plan:  1  Swelling of upper arm    The swelling of the right upper extremity has improved significantly after she was started on the  Eliquis as direct oral anticoagulant  We did discuss the potential diagnosis of thoracic outlet syndrome since she had unprovoked /continues right upper extremity occlusive DVT with superficial thrombophlebitis of multiple veins  The patient was told that we will send her to the vascular surgical team for further evaluation to rule out thoracic outlet syndrome  - Ambulatory referral to Vascular Surgery; Future    2  Acute deep vein thrombosis (DVT) of axillary vein of right upper extremity (HCC)    I discussed the unprovoked nature of the right upper extremity and the positive lupus anticoagulants test which was done prior to the initiation of the anticoagulation  She was told that the positive lupus anticoagulant test may be an indication of antiphospholipid antibody syndrome which would require a long-term anticoagulation rather than 3-6 months  We also discussed switching her anticoagulation from Eliquis to Coumadin since Coumadin is superior for patients with APLS  We will also repeat the anti cardiolipin IgM titer level which was barely above normal at the time of the clotting event  We also discussed the low protein S activity and antigen levels which could be due to the acute thrombosis since it was drawn at the same day  We will repeat that level again prior to her next visit in 2 months from now  The patient will need to follow-up with us in 2 months from now to discuss the results of the ordered test in then will act accordingly     - CBC and differential; Future  - Comprehensive metabolic panel; Future  - Protein C activity; Future  - Protein C antigen, total; Future  - Protein S activity;  Future  - Protein S Antigen, Total; Future  - Lupus anticoagulant; Future  - D-dimer, quantitative; Future  - LD,Blood; Future  - SADI Screen w/ Reflex to Titer/Pattern; Future  - RF Screen w/ Reflex to Titer; Future  - C-reactive protein; Future  - Sedimentation rate, automated; Future  - Ambulatory referral to Vascular Surgery; Future  - Cardiolipin antibody; Future      HPI:   This is a very pleasant 16-year-old female 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  She does not seem to have any obvious past medical history or family history for hypercoagulable disorder  She was never pregnant and denied any miscarriages  The patient stated that she had signet swelling and pain of the medial aspect of her right elbow and right arm in general around the 8th of February  She was then evaluated with a Doppler ultrasound on 02/08/2021 which showed evidence of acute occlusive, deep vein thrombosis in the distal axillary and proximal brachial veins  There was also evidence of acute superficial thrombophlebitis in the cephalic vein at the distal upper arm and proximal and mid forearm and in the basilic vein at the mid and distal upper arm and in the median cubital vein  The left upper extremity was negative for any clotting  The patient  Was then evaluated with thrombosis panel before she was started on anticoagulation with Eliquis 10 mg twice a day for a week then Eliquis 5 mg twice a day  Which she continued to take on a daily basis without any complications or bleeding  She did notice heavy menstrual bleeding after the start of Eliquis  The thrombosis panel came back positive for lupus anticoagulant  The beta 2 glycoprotein antibodies were within normal range  The anti cardiolipin IgM level was 14 which is in the indeterminate range  The protein  S activity level was 10% the protein S antigen level was 39% and protein S antigen free level was 28%  Protein C activity level was 97%    Her CBC showed a white cell count of 10 0 with hemoglobin of 12 4 and platelet count of 008  Her CMP was normal with slightly elevated total protein level of 8 6  D-dimer is 4 85  Pregnancy test was negative  Interval history:    ROS: Review of Systems   Constitutional: Negative for chills and fever  HENT: Negative for ear pain and sore throat  Eyes: Negative for pain and visual disturbance  Respiratory: Negative for cough and shortness of breath  Cardiovascular: Negative for chest pain and palpitations  Gastrointestinal: Negative for abdominal pain and vomiting  Genitourinary: Negative for dysuria and hematuria  Musculoskeletal: Negative for arthralgias and back pain  Skin: Negative for color change and rash  Neurological: Negative for seizures and syncope  All other systems reviewed and are negative  History reviewed  No pertinent past medical history  History reviewed  No pertinent surgical history      Social History     Socioeconomic History    Marital status: Single     Spouse name: None    Number of children: None    Years of education: None    Highest education level: None   Occupational History    None   Social Needs    Financial resource strain: None    Food insecurity     Worry: None     Inability: None    Transportation needs     Medical: None     Non-medical: None   Tobacco Use    Smoking status: Never Smoker    Smokeless tobacco: Never Used   Substance and Sexual Activity    Alcohol use: Yes     Frequency: Monthly or less     Drinks per session: 1 or 2     Binge frequency: Never    Drug use: No    Sexual activity: Yes     Partners: Male     Birth control/protection: Condom Male   Lifestyle    Physical activity     Days per week: None     Minutes per session: None    Stress: None   Relationships    Social connections     Talks on phone: None     Gets together: None     Attends Mandaeism service: None     Active member of club or organization: None     Attends meetings of clubs or organizations: None     Relationship status: None    Intimate partner violence     Fear of current or ex partner: None     Emotionally abused: None     Physically abused: None     Forced sexual activity: None   Other Topics Concern    None   Social History Narrative    None       Family History   Problem Relation Age of Onset    Hypertension Mother     Benign prostatic hyperplasia Father     Stroke Maternal Grandmother     No Known Problems Sister     No Known Problems Brother     Endometriosis Sister        No Known Allergies      Current Outpatient Medications:     acetaminophen-codeine (TYLENOL #2) 300-15 MG per tablet, Take 1 tablet by mouth every 4 (four) hours as needed for moderate pain, Disp: 30 tablet, Rfl: 0    apixaban (ELIQUIS) 5 mg, Take 1 tablet (5 mg total) by mouth 2 (two) times a day, Disp: 60 tablet, Rfl: 4    clindamycin (CLINDAGEL) 1 % gel, Apply topically 2 (two) times a day, Disp: 30 g, Rfl: 3      Physical Exam:  /74 (BP Location: Left arm, Patient Position: Sitting, Cuff Size: Adult)   Pulse 96   Temp 98 5 °F (36 9 °C) (Tympanic)   Resp 18   Ht 5' 4" (1 626 m)   Wt 59 3 kg (130 lb 12 8 oz)   LMP 03/02/2021 (Exact Date) Comment: heavy period  SpO2 97%   BMI 22 45 kg/m²     Physical Exam  Constitutional:       General: She is not in acute distress  Appearance: She is well-developed  She is not diaphoretic  HENT:      Head: Normocephalic and atraumatic  Eyes:      General: No scleral icterus  Right eye: No discharge  Left eye: No discharge  Conjunctiva/sclera: Conjunctivae normal       Pupils: Pupils are equal, round, and reactive to light  Neck:      Musculoskeletal: Normal range of motion and neck supple  Thyroid: No thyromegaly  Vascular: No JVD  Trachea: No tracheal deviation  Cardiovascular:      Rate and Rhythm: Normal rate and regular rhythm  Heart sounds: Normal heart sounds  No murmur  No friction rub     Pulmonary: Effort: Pulmonary effort is normal  No respiratory distress  Breath sounds: Normal breath sounds  No stridor  No wheezing or rales  Chest:      Chest wall: No tenderness  Abdominal:      General: There is no distension  Palpations: Abdomen is soft  There is no hepatomegaly or splenomegaly  Tenderness: There is no abdominal tenderness  There is no guarding or rebound  Musculoskeletal: Normal range of motion  General: No tenderness or deformity  Lymphadenopathy:      Cervical: No cervical adenopathy  Skin:     General: Skin is warm and dry  Coloration: Skin is not pale  Findings: No erythema or rash  Neurological:      Mental Status: She is alert and oriented to person, place, and time  Cranial Nerves: No cranial nerve deficit  Coordination: Coordination normal       Deep Tendon Reflexes: Reflexes are normal and symmetric  Psychiatric:         Behavior: Behavior normal          Thought Content: Thought content normal          Judgment: Judgment normal            Labs:  Lab Results   Component Value Date    WBC 10 07 02/08/2021    HGB 12 4 02/08/2021    HCT 40 4 02/08/2021    MCV 86 02/08/2021     (H) 02/08/2021     Lab Results   Component Value Date    K 4 1 02/08/2021     02/08/2021    CO2 28 02/08/2021    BUN 16 02/08/2021    CREATININE 0 86 02/08/2021    GLUF 72 02/08/2021    CALCIUM 9 8 02/08/2021    AST 17 02/08/2021    ALT 19 02/08/2021    ALKPHOS 77 02/08/2021    EGFR 106 02/08/2021       Patient voiced understanding and agreement in the above discussion  Aware to contact our office with questions/symptoms in the interim

## 2021-03-29 ENCOUNTER — APPOINTMENT (OUTPATIENT)
Dept: LAB | Facility: CLINIC | Age: 31
End: 2021-03-29
Payer: COMMERCIAL

## 2021-03-29 ENCOUNTER — ANTICOAG VISIT (OUTPATIENT)
Dept: FAMILY MEDICINE CLINIC | Facility: CLINIC | Age: 31
End: 2021-03-29

## 2021-03-29 DIAGNOSIS — I82.A11 ACUTE DEEP VEIN THROMBOSIS (DVT) OF AXILLARY VEIN OF RIGHT UPPER EXTREMITY (HCC): Primary | ICD-10-CM

## 2021-03-29 DIAGNOSIS — I82.A11 ACUTE DEEP VEIN THROMBOSIS (DVT) OF AXILLARY VEIN OF RIGHT UPPER EXTREMITY (HCC): ICD-10-CM

## 2021-03-29 LAB
INR PPP: 1.23 (ref 0.84–1.19)
PROTHROMBIN TIME: 15.5 SECONDS (ref 11.6–14.5)

## 2021-03-29 PROCEDURE — 36415 COLL VENOUS BLD VENIPUNCTURE: CPT

## 2021-03-29 PROCEDURE — 85610 PROTHROMBIN TIME: CPT

## 2021-03-29 RX ORDER — WARFARIN SODIUM 1 MG/1
TABLET ORAL
Qty: 30 TABLET | Refills: 2 | Status: SHIPPED | OUTPATIENT
Start: 2021-03-29 | End: 2021-04-05

## 2021-03-29 NOTE — PROGRESS NOTES
Patient started on warfarin 3/26/2021  5 mg daily, today's INR was below 1 5 on day 4, will increase dose to 6 mg daily, recheck INR on 4/2/2021

## 2021-03-31 ENCOUNTER — IMMUNIZATIONS (OUTPATIENT)
Dept: FAMILY MEDICINE CLINIC | Facility: HOSPITAL | Age: 31
End: 2021-03-31

## 2021-03-31 ENCOUNTER — TELEMEDICINE (OUTPATIENT)
Dept: OBGYN CLINIC | Facility: CLINIC | Age: 31
End: 2021-03-31
Payer: COMMERCIAL

## 2021-03-31 DIAGNOSIS — I82.A11 ACUTE DEEP VEIN THROMBOSIS (DVT) OF AXILLARY VEIN OF RIGHT UPPER EXTREMITY (HCC): ICD-10-CM

## 2021-03-31 DIAGNOSIS — Z23 ENCOUNTER FOR IMMUNIZATION: Primary | ICD-10-CM

## 2021-03-31 DIAGNOSIS — Z30.09 ENCOUNTER FOR OTHER GENERAL COUNSELING OR ADVICE ON CONTRACEPTION: Primary | ICD-10-CM

## 2021-03-31 PROCEDURE — 91300 SARS-COV-2 / COVID-19 MRNA VACCINE (PFIZER-BIONTECH) 30 MCG: CPT

## 2021-03-31 PROCEDURE — 0002A SARS-COV-2 / COVID-19 MRNA VACCINE (PFIZER-BIONTECH) 30 MCG: CPT

## 2021-03-31 PROCEDURE — 99212 OFFICE O/P EST SF 10 MIN: CPT | Performed by: OBSTETRICS & GYNECOLOGY

## 2021-03-31 NOTE — PROGRESS NOTES
Assessment/Plan:   anteverted uterus on the small side   Kyleena recommended   abstinence until insertion,  2 Advil 1 hour before   currently having a heavy menses on Coumadin       Diagnoses and all orders for this visit:    Encounter for initial prescription of intrauterine contraceptive device (IUD)    Acute deep vein thrombosis (DVT) of axillary vein of right upper extremity ([facility] )    Current use of long term anticoagulation              Subjective:        Patient ID: Yas Watson is a 35 y o  female  Dr Doreen Payne presents today for examination in preparation for a medicated IUD  Her menses began 2 days ago and is very heavy since being on Coumadin  She developed a DVT in the right axillary vein in February and the NuvaRing was discontinued  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  Outside of the DVT her health has remained stable since last seen in October  The following portions of the patient's history were reviewed and updated as appropriate: She  has a past medical history of DVT (deep venous thrombosis) ([facility] ) (02/08/2021)  Patient Active Problem List    Diagnosis Date Noted    Acute deep vein thrombosis (DVT) of axillary vein of right upper extremity ([facility] ) 03/26/2021    Viral infection, unspecified 11/24/2020    Exposure to COVID-19 virus 11/24/2020    Fatigue 07/22/2020    Acne vulgaris 07/22/2020    Hyperpigmentation of skin 07/22/2020    Annual physical exam 10/30/2018     She  has no past surgical history on file  Her family history includes Benign prostatic hyperplasia in her father; Endometriosis in her sister; Hypertension in her mother; No Known Problems in her brother and sister; Stroke in her maternal grandmother  She  reports that she has never smoked  She has never used smokeless tobacco  She reports current alcohol use  She reports that she does not use drugs    Current Outpatient Medications   Medication Sig Dispense Refill    acetaminophen-codeine (TYLENOL #2) 300-15 MG per tablet Take 1 tablet by mouth every 4 (four) hours as needed for moderate pain 30 tablet 0    clindamycin (CLINDAGEL) 1 % gel Apply topically 2 (two) times a day 30 g 3    warfarin (COUMADIN) 1 mg tablet Take one tablet daily 30 tablet 2    warfarin (COUMADIN) 5 mg tablet Take 1 tablet daily 30 tablet 1     No current facility-administered medications for this visit  Current Outpatient Medications on File Prior to Visit   Medication Sig    acetaminophen-codeine (TYLENOL #2) 300-15 MG per tablet Take 1 tablet by mouth every 4 (four) hours as needed for moderate pain    clindamycin (CLINDAGEL) 1 % gel Apply topically 2 (two) times a day    warfarin (COUMADIN) 1 mg tablet Take one tablet daily    warfarin (COUMADIN) 5 mg tablet Take 1 tablet daily     No current facility-administered medications on file prior to visit  She has No Known Allergies       Review of Systems   Constitutional: Negative  Genitourinary: Positive for menstrual problem ( much heavier than usual on Coumadin)  Objective:    Vitals:    04/01/21 0825   BP: 108/62   BP Location: Left arm   Patient Position: Sitting   Cuff Size: Standard   Weight: 59 kg (130 lb)   Height: 5' 4" (1 626 m)            Physical Exam  Vitals signs and nursing note reviewed  Exam conducted with a chaperone present  Constitutional:       Appearance: Normal appearance  HENT:      Head: Normocephalic and atraumatic  Eyes:      General: No scleral icterus  Right eye: No discharge  Left eye: No discharge  Extraocular Movements: Extraocular movements intact  Genitourinary:     General: Normal vulva  Comments: The vulva is coated in blood  The uterus is anteverted and on the smaller side  There are no adnexal masses appreciated  Musculoskeletal:      Right lower leg: No edema  Left lower leg: No edema  Skin:     General: Skin is warm and dry     Neurological: Mental Status: She is alert and oriented to person, place, and time  Psychiatric:         Mood and Affect: Mood normal          Behavior: Behavior normal          Thought Content:  Thought content normal          Judgment: Judgment normal

## 2021-03-31 NOTE — PROGRESS NOTES
Virtual Regular Visit      Assessment/Plan:    Problem List Items Addressed This Visit        Cardiovascular and Mediastinum    Acute deep vein thrombosis (DVT) of axillary vein of right upper extremity (HCC)      Other Visit Diagnoses     Encounter for other general counseling or advice on contraception    -  Primary       Due to the recent DVT of estrogen containing contraceptives are contraindicated  Dr Ar Eisenberg was interested in IUDs  These were fully discussed including the mode of action, efficacy, satisfaction rate, risks and side effects  Risks include  Infection,Perforation, expulsion, imbedment and failure  Ectopic pregnancies are more common with failure  Arianna Copier, and Mirena were compared and contrasted  She is already having heavier menses on Coumadin  She would like the Mirena but that would be dependent on uterine size  This was explained  She is a nulligravida  All questions were answered  She will return for an examination to decide whether a [place] or Mirena would fit into the uterus  Insertion would then be performed during menses  Reason for visit is   Chief Complaint   Patient presents with    Virtual Regular Visit        Encounter provider Nilesh Grant MD    Provider located at [place] [address]  [address] [place] [id number]      Recent Visits  No visits were found meeting these conditions  Showing recent visits within past 7 days and meeting all other requirements     Today's Visits  Date Type Provider Dept   03/31/21 Telemedicine Nilesh Grant MD [facility] today's visits and meeting all other requirements     Future Appointments  No visits were found meeting these conditions  Showing future appointments within next 150 days and meeting all other requirements        The patient was identified by name and date of birth   [facility] was informed that this is a telemedicine visit and that the visit is being conducted through [employer] and patient was informed that this is a secure, HIPAA-compliant platform  She agrees to proceed     My office door was closed  No one else was in the room  She acknowledged consent and understanding of privacy and security of the video platform  The patient has agreed to participate and understands they can discontinue the visit at any time  Patient is aware this is a billable service  Subjective  Duglas Garcia is a 35 y o  female presenting for a consultation on contraceptive options   Dr Dale Perez presents today to discuss birth control options after having to discontinue NuvaRing 2 months ago due to a DVT  She developed a right upper arm DVT in the axillary vein  The evaluation is ongoing as to the possible etiology other than being on a estrogen containing contraceptive  She currently is on Coumadin and her menses are slightly heavier than usual   She is interested in an IUD  When evaluated in October for her annual visit she had no gynecological issues or complaints  She most likely will be on Coumadin for 6 months  She does want to have children eventually but not in the near future  Her menses had been every 28-30 days with an average menstrual flow  She has no contraindications for a medicated IUD  No past medical history on file  No past surgical history on file  Current Outpatient Medications   Medication Sig Dispense Refill    acetaminophen-codeine (TYLENOL #2) 300-15 MG per tablet Take 1 tablet by mouth every 4 (four) hours as needed for moderate pain 30 tablet 0    clindamycin (CLINDAGEL) 1 % gel Apply topically 2 (two) times a day 30 g 3    warfarin (COUMADIN) 1 mg tablet Take one tablet daily 30 tablet 2    warfarin (COUMADIN) 5 mg tablet Take 1 tablet daily 30 tablet 1     No current facility-administered medications for this visit           No Known Allergies    Review of Systems Constitutional: Negative  Genitourinary: Negative for menstrual problem  Video Exam    There were no vitals filed for this visit  Physical Exam  Constitutional:       General: She is not in acute distress  Pulmonary:      Effort: Pulmonary effort is normal  No respiratory distress  Neurological:      Mental Status: She is alert and oriented to person, place, and time  Psychiatric:         Mood and Affect: Mood normal          Behavior: Behavior normal          Thought Content: Thought content normal          Judgment: Judgment normal           I spent 15 minutes directly with the patient during this visit  100% was spent on counseling regarding IUDs  It was my intent to perform this visit via video technology but the patient was not able to do a video connection so the visit was completed via audio telephone only  VIRTUAL VISIT DISCLAIMER    Luis Redman acknowledges that she has consented to an online visit or consultation  She understands that the online visit is based solely on information provided by her, and that, in the absence of a face-to-face physical evaluation by the physician, the diagnosis she receives is both limited and provisional in terms of accuracy and completeness  This is not intended to replace a full medical face-to-face evaluation by the physician  Luis Redman understands and accepts these terms

## 2021-04-01 ENCOUNTER — OFFICE VISIT (OUTPATIENT)
Dept: OBGYN CLINIC | Facility: CLINIC | Age: 31
End: 2021-04-01
Payer: COMMERCIAL

## 2021-04-01 VITALS
BODY MASS INDEX: 22.2 KG/M2 | HEIGHT: 64 IN | DIASTOLIC BLOOD PRESSURE: 62 MMHG | SYSTOLIC BLOOD PRESSURE: 108 MMHG | WEIGHT: 130 LBS

## 2021-04-01 DIAGNOSIS — I82.A11 ACUTE DEEP VEIN THROMBOSIS (DVT) OF AXILLARY VEIN OF RIGHT UPPER EXTREMITY (HCC): ICD-10-CM

## 2021-04-01 DIAGNOSIS — Z30.014 ENCOUNTER FOR INITIAL PRESCRIPTION OF INTRAUTERINE CONTRACEPTIVE DEVICE (IUD): Primary | ICD-10-CM

## 2021-04-01 DIAGNOSIS — Z79.01 CURRENT USE OF LONG TERM ANTICOAGULATION: ICD-10-CM

## 2021-04-01 PROCEDURE — 99212 OFFICE O/P EST SF 10 MIN: CPT | Performed by: OBSTETRICS & GYNECOLOGY

## 2021-04-02 ENCOUNTER — APPOINTMENT (OUTPATIENT)
Dept: LAB | Facility: CLINIC | Age: 31
End: 2021-04-02
Payer: COMMERCIAL

## 2021-04-02 ENCOUNTER — CONSULT (OUTPATIENT)
Dept: VASCULAR SURGERY | Facility: CLINIC | Age: 31
End: 2021-04-02
Payer: COMMERCIAL

## 2021-04-02 VITALS
HEIGHT: 64 IN | BODY MASS INDEX: 22.2 KG/M2 | DIASTOLIC BLOOD PRESSURE: 60 MMHG | WEIGHT: 130 LBS | RESPIRATION RATE: 17 BRPM | HEART RATE: 69 BPM | SYSTOLIC BLOOD PRESSURE: 106 MMHG | TEMPERATURE: 97 F

## 2021-04-02 DIAGNOSIS — I82.A11 ACUTE DEEP VEIN THROMBOSIS (DVT) OF AXILLARY VEIN OF RIGHT UPPER EXTREMITY (HCC): ICD-10-CM

## 2021-04-02 DIAGNOSIS — M79.89 SWELLING OF UPPER ARM: ICD-10-CM

## 2021-04-02 LAB
INR PPP: 1.93 (ref 0.84–1.19)
PROTHROMBIN TIME: 21.9 SECONDS (ref 11.6–14.5)

## 2021-04-02 PROCEDURE — 99244 OFF/OP CNSLTJ NEW/EST MOD 40: CPT | Performed by: PHYSICIAN ASSISTANT

## 2021-04-02 PROCEDURE — 85610 PROTHROMBIN TIME: CPT

## 2021-04-02 PROCEDURE — 36415 COLL VENOUS BLD VENIPUNCTURE: CPT

## 2021-04-02 NOTE — PATIENT INSTRUCTIONS
Deep Vein Thrombosis   WHAT YOU NEED TO KNOW:   Deep vein thrombosis (DVT) is a blood clot that forms in a deep vein of the body  The DVT can break into smaller pieces and travel to your lungs and cause a blockage called a pulmonary embolism  A PE can become life-threatening  It is important to go to all follow-up appointments and to take blood thinners as directed  This is especially important if you were discharged home from the emergency department  DISCHARGE INSTRUCTIONS:   Call your local emergency number (911 in the [address] [facility]) if:   · You feel lightheaded, short of breath, and have chest pain  · You cough up blood  Call your doctor if:   · Your arm or leg feels warm, tender, and painful  It may look swollen and red  · You have questions or concerns about your condition or care  Medicines:   · Blood thinners  help prevent blood clots  Clots can cause strokes, heart attacks, and death  The following are general safety guidelines to follow while you are taking a blood thinner:    ? Watch for bleeding and bruising while you take blood thinners  Watch for bleeding from your gums or nose  Watch for blood in your urine and bowel movements  Use a soft washcloth on your skin, and a soft toothbrush to brush your teeth  This can keep your skin and gums from bleeding  If you shave, use an electric shaver  Do not play contact sports  ? Tell your dentist and other healthcare providers that you take a blood thinner  Wear a bracelet or necklace that says you take this medicine  ? Do not start or stop any other medicines unless your healthcare provider tells you to  Many medicines cannot be used with blood thinners  ? Take your blood thinner exactly as prescribed by your healthcare provider  Do not skip does or take less than prescribed  Tell your provider right away if you forget to take your blood thinner, or if you take too much  ? Warfarin  is a blood thinner that you may need to take   The following are things you should be aware of if you take warfarin:     § Foods and medicines can affect the amount of warfarin in your blood  Do not make major changes to your diet while you take warfarin  Warfarin works best when you eat about the same amount of vitamin K every day  Vitamin K is found in green leafy vegetables and certain other foods  Ask for more information about what to eat when you are taking warfarin  § You will need to see your healthcare provider for follow-up visits when you are on warfarin  You will need regular blood tests  These tests are used to decide how much medicine you need  · Take your medicine as directed  Contact your healthcare provider if you think your medicine is not helping or if you have side effects  Tell him or her if you are allergic to any medicine  Keep a list of the medicines, vitamins, and herbs you take  Include the amounts, and when and why you take them  Bring the list or the pill bottles to follow-up visits  Carry your medicine list with you in case of an emergency  Manage a DVT:   · Wear pressure stockings as directed  The stockings put pressure on your legs  This improves blood flow and helps prevent clots  Wear the stockings during the day  Do not wear them when you sleep  · Elevate your legs above the level of your heart  Elevate your legs when you sit or lie down, as often as you can  This will help decrease swelling and pain  Prop your legs on pillows or blankets to keep them elevated comfortably  Prevent a DVT:   · Exercise regularly to help increase your blood flow  Walking is a good low-impact exercise  Talk to your healthcare provider about the best exercise plan for you  · Change your body position or move around often  Move and stretch in your seat several times each hour if you travel by car or work at a desk  In an airplane, get up and walk every hour   Move your legs by tightening and releasing your leg muscles while sitting  You can move your legs while sitting by raising and lowering your heels  Keep your toes on the floor while you do this  You can also raise and lower your toes while keeping your heels on the floor  · Maintain a healthy weight  Ask your healthcare provider how much you should weigh  Ask him or her to help you create a weight loss plan if you are overweight  · Do not smoke  Nicotine and other chemicals in cigarettes and cigars can damage blood vessels and make it more difficult to manage your DVT  Ask your healthcare provider for information if you currently smoke and need help to quit  E-cigarettes or smokeless tobacco still contain nicotine  Talk to your healthcare provider before you use these products  · Ask about birth control if you are a woman who takes the pill  A birth control pill increases the risk for PE in certain women  The risk is higher if you are also older than 35, smoke cigarettes, or have a blood clotting disorder  Talk to your healthcare provider about other ways to prevent pregnancy, such as a cervical cap or intrauterine device (IUD)  Follow up with your doctor or specialist as directed: You may need to come in regularly for scans to check for blood clots  Your blood may checked to see how long it takes to clot  Your doctor or specialist will tell you if you need to have this test and how often to have it  Write down your questions so you remember to ask them during your visits  © Copyright [address] Information is for End User's use only and may not be sold, redistributed or otherwise used for commercial purposes  All illustrations and images included in CareNotes® are the copyrighted property of A D A M , Inc  or [facility] [facility] [place]   The above information is an  only  It is not intended as medical advice for individual conditions or treatments   Talk to your doctor, nurse or pharmacist before following any medical regimen to see if it is safe and effective for you

## 2021-04-02 NOTE — PROGRESS NOTES
Assessment/Plan:    Acute deep vein thrombosis (DVT) of axillary vein of right upper extremity ([facility] [address] )  35year old female with no significant past medical history, previously on hormonal birth control presenting with RUE swelling and pain secondary to acute occlusive DVT  RUE venous duplex 2/8/21  -Acute occlusive DVT of the axillary and brachial veins  -Superficial thrombophlebitis of the cephalic, basilic and median cubital veins    Plan:  -RUE DVT in the setting of previous hormonal birth control use  Patient denies trauma or injury to the RUE  -Patient denies heavy lifting, repetitive overhead movements  She does report she sleeps with her arms above her head at times   -Has been on coumadin, symptoms have resolved/improved since initial presentation  -Patient has been seeing hematology- coagulopathy labs ordered  -History is not completely consistent with TOS  However unusual for upper extremity DVT in young healthy patient  Venous duplex did not comment on subclavian/jugular veins  -I suspect DVT was provoked from birthcontrol/possible underlying coagulopathy  However will order CTA of the chest/upper extremity with venous phase and provocative maneuvers for completeness   -Follow up with surgeon following CTA  -Warm compresses and elevation for superficial thrombophlebitis  -Continue work up per hematology   -Continue coumadin   -Advised patient to seek medical attention with worsening symptoms/SOB or chest pain        Diagnoses and all orders for this visit:    Swelling of upper arm  -     Ambulatory referral to Vascular Surgery    Acute deep vein thrombosis (DVT) of axillary vein of right upper extremity ([facility] [address] )  -     Ambulatory referral to Vascular Surgery  -     CTA chest wo w contrast; Future        Subjective:      Patient ID: Sourav Jaeger is a 35 y o  female  New patient presents in office for DVT  Patient had a UEA done on 02/08/21  Patient had swelling but states it has gone down   Patient states that in the beginning she did have discolration to the right arm but since then it has gone down and has almost disappear  Patient is currently taking Warfarin  50-year-old female with right upper extremity swelling and pain secondary to acute occlusive DVT and superficial thrombophlebitis  Patient was previously on hormonal birth control but has since stopped this  Patient denies any trauma or injury to the right upper extremity that would provoke a DVT  Patient reports that she was sitting at home in bed when she noticed increased pain to the antecubital fossa of the right upper extremity  She did notice some increased swelling of her hand and arm as well  She denies any overhead lifting, excessive activity involving her upper extremity  She reports that at times she does sleep with her arms above her head  She denies any paresthesias to her upper extremity  She is following with Hematology Oncology in undergoing workup for coagulopathy  The following portions of the patient's history were reviewed and updated as appropriate: allergies, current medications, past family history, past medical history, past social history, past surgical history and problem list     Review of Systems   Constitutional: Negative  HENT: Negative  Eyes: Negative  Respiratory: Negative  Cardiovascular: Negative  Gastrointestinal: Negative  Endocrine: Negative  Genitourinary: Negative  Musculoskeletal: Negative  Skin: Negative  Allergic/Immunologic: Positive for environmental allergies  Neurological: Negative  Hematological: Negative  Psychiatric/Behavioral: Negative  I have reviewed and made appropriate changes to the review of systems input by the medical assistant      Vitals:    04/02/21 1553   BP: 106/60   BP Location: Left arm   Patient Position: Sitting   Cuff Size: Adult   Pulse: 69   Resp: 17   Temp: (!) 97 °F (36 1 °C)   TempSrc: Tympanic   Weight: 59 kg (130 lb)   Height: 5' 4" (1 626 m)       Patient Active Problem List   Diagnosis    Annual physical exam    Fatigue    Acne vulgaris    Hyperpigmentation of skin    Viral infection, unspecified    Exposure to COVID-19 virus    Acute deep vein thrombosis (DVT) of axillary vein of right upper extremity ([facility] [address] )       History reviewed  No pertinent surgical history      Family History   Problem Relation Age of Onset    Hypertension Mother     Benign prostatic hyperplasia Father     Stroke Maternal Grandmother     No Known Problems Sister     No Known Problems Brother     Endometriosis Sister        Social History     Socioeconomic History    Marital status: Single     Spouse name: Not on file    Number of children: Not on file    Years of education: Not on file    Highest education level: Not on file   Occupational History    Not on file   Social Needs    Financial resource strain: Not on file    Food insecurity     Worry: Not on file     Inability: Not on file    Transportation needs     Medical: Not on file     Non-medical: Not on file   Tobacco Use    Smoking status: Never Smoker    Smokeless tobacco: Never Used   Substance and Sexual Activity    Alcohol use: Yes     Frequency: Monthly or less     Drinks per session: 1 or 2     Binge frequency: Never    Drug use: No    Sexual activity: Yes     Partners: Male     Birth control/protection: Condom Male   Lifestyle    Physical activity     Days per week: Not on file     Minutes per session: Not on file    Stress: Not on file   Relationships    Social connections     Talks on phone: Not on file     Gets together: Not on file     Attends [employer] service: Not on file     Active member of club or organization: Not on file     Attends meetings of clubs or organizations: Not on file     Relationship status: Not on file    Intimate partner violence     Fear of current or ex partner: Not on file     Emotionally abused: Not on file     Physically abused: Not on file     Forced sexual activity: Not on file   Other Topics Concern    Not on file   Social History Narrative    Not on file       No Known Allergies      Current Outpatient Medications:     acetaminophen-codeine (TYLENOL #2) 300-15 MG per tablet, Take 1 tablet by mouth every 4 (four) hours as needed for moderate pain, Disp: 30 tablet, Rfl: 0    clindamycin (CLINDAGEL) 1 % gel, Apply topically 2 (two) times a day, Disp: 30 g, Rfl: 3    warfarin (COUMADIN) 1 mg tablet, Take one tablet daily, Disp: 30 tablet, Rfl: 2    warfarin (COUMADIN) 5 mg tablet, Take 1 tablet daily, Disp: 30 tablet, Rfl: 1    Objective:      /60 (BP Location: Left arm, Patient Position: Sitting, Cuff Size: Adult)   Pulse 69   Temp (!) 97 °F (36 1 °C) (Tympanic)   Resp 17   Ht 5' 4" (1 626 m)   Wt 59 kg (130 lb)   LMP 03/30/2021 Comment: heavy period  BMI 22 31 kg/m²          Physical Exam  Constitutional:       General: She is not in acute distress  Appearance: Normal appearance  She is not ill-appearing, toxic-appearing or diaphoretic  HENT:      Head: Normocephalic and atraumatic  Right Ear: External ear normal       Left Ear: External ear normal       Nose: Nose normal       Mouth/Throat:      Mouth: Mucous membranes are moist       Pharynx: Oropharynx is clear  Eyes:      General: No scleral icterus  Extraocular Movements: Extraocular movements intact  Conjunctiva/sclera: Conjunctivae normal    Neck:      Musculoskeletal: Normal range of motion and neck supple  Cardiovascular:      Rate and Rhythm: Normal rate and regular rhythm  Pulses:           Radial pulses are 2+ on the right side and 2+ on the left side  Popliteal pulses are 2+ on the right side and 2+ on the left side  Dorsalis pedis pulses are 2+ on the right side and 2+ on the left side  Heart sounds: Normal heart sounds  Pulmonary:      Effort: Pulmonary effort is normal  No respiratory distress  Breath sounds: Normal breath sounds  Abdominal:      General: Abdomen is flat  Palpations: Abdomen is soft  Tenderness: There is no abdominal tenderness  Musculoskeletal: Normal range of motion  Skin:     General: Skin is warm and dry  Neurological:      General: No focal deficit present  Mental Status: She is alert and oriented to person, place, and time  Mental status is at baseline  Cranial Nerves: No cranial nerve deficit  Sensory: No sensory deficit  Motor: No weakness     Psychiatric:         Mood and Affect: Mood normal          Behavior: Behavior normal

## 2021-04-02 NOTE — ASSESSMENT & PLAN NOTE
35year old female with no significant past medical history, previously on hormonal birth control presenting with RUE swelling and pain secondary to acute occlusive DVT  RUE venous duplex 2/8/21  -Acute occlusive DVT of the axillary and brachial veins  -Superficial thrombophlebitis of the cephalic, basilic and median cubital veins    Plan:  -RUE DVT in the setting of previous hormonal birth control use  Patient denies trauma or injury to the RUE  -Patient denies heavy lifting, repetitive overhead movements  She does report she sleeps with her arms above her head at times   -Has been on coumadin, symptoms have resolved/improved since initial presentation  -Patient has been seeing hematology- coagulopathy labs ordered  -History is not completely consistent with TOS  However unusual for upper extremity DVT in young healthy patient  Venous duplex did not comment on subclavian/jugular veins  -I suspect DVT was provoked from birthcontrol/possible underlying coagulopathy   However will order CTA of the chest/upper extremity with venous phase and provocative maneuvers for completeness   -Follow up with surgeon following CTA  -Warm compresses and elevation for superficial thrombophlebitis  -Continue work up per hematology   -Continue coumadin   -Advised patient to seek medical attention with worsening symptoms/SOB or chest pain

## 2021-04-05 ENCOUNTER — ANTICOAG VISIT (OUTPATIENT)
Dept: FAMILY MEDICINE CLINIC | Facility: CLINIC | Age: 31
End: 2021-04-05

## 2021-04-05 DIAGNOSIS — I82.A11 ACUTE DEEP VEIN THROMBOSIS (DVT) OF AXILLARY VEIN OF RIGHT UPPER EXTREMITY (HCC): ICD-10-CM

## 2021-04-05 RX ORDER — WARFARIN SODIUM 5 MG/1
TABLET ORAL
Qty: 30 TABLET | Refills: 3 | Status: SHIPPED | OUTPATIENT
Start: 2021-04-05 | End: 2021-04-12

## 2021-04-05 RX ORDER — WARFARIN SODIUM 1 MG/1
TABLET ORAL
Qty: 60 TABLET | Refills: 2 | Status: SHIPPED | OUTPATIENT
Start: 2021-04-05 | End: 2021-04-12

## 2021-04-06 DIAGNOSIS — M62.838 MUSCLE SPASM: Primary | ICD-10-CM

## 2021-04-06 RX ORDER — CYCLOBENZAPRINE HCL 10 MG
10 TABLET ORAL 3 TIMES DAILY PRN
Qty: 60 TABLET | Refills: 0 | Status: SHIPPED | OUTPATIENT
Start: 2021-04-06

## 2021-04-09 ENCOUNTER — APPOINTMENT (OUTPATIENT)
Dept: LAB | Facility: CLINIC | Age: 31
End: 2021-04-09
Payer: COMMERCIAL

## 2021-04-09 DIAGNOSIS — I82.A11 ACUTE DEEP VEIN THROMBOSIS (DVT) OF AXILLARY VEIN OF RIGHT UPPER EXTREMITY (HCC): ICD-10-CM

## 2021-04-09 LAB
INR PPP: 2.52 (ref 0.84–1.19)
PROTHROMBIN TIME: 27.1 SECONDS (ref 11.6–14.5)

## 2021-04-09 PROCEDURE — 85610 PROTHROMBIN TIME: CPT

## 2021-04-09 PROCEDURE — 36415 COLL VENOUS BLD VENIPUNCTURE: CPT

## 2021-04-10 ENCOUNTER — ANTICOAG VISIT (OUTPATIENT)
Dept: OTHER | Facility: HOSPITAL | Age: 31
End: 2021-04-10

## 2021-04-10 DIAGNOSIS — I82.A11 ACUTE DEEP VEIN THROMBOSIS (DVT) OF AXILLARY VEIN OF RIGHT UPPER EXTREMITY (HCC): ICD-10-CM

## 2021-04-12 DIAGNOSIS — I82.A11 ACUTE DEEP VEIN THROMBOSIS (DVT) OF AXILLARY VEIN OF RIGHT UPPER EXTREMITY (HCC): ICD-10-CM

## 2021-04-12 RX ORDER — WARFARIN SODIUM 5 MG/1
TABLET ORAL
Qty: 30 TABLET | Refills: 11 | Status: SHIPPED | OUTPATIENT
Start: 2021-04-12

## 2021-04-12 RX ORDER — WARFARIN SODIUM 1 MG/1
TABLET ORAL
Qty: 60 TABLET | Refills: 11 | Status: SHIPPED | OUTPATIENT
Start: 2021-04-12 | End: 2021-04-22 | Stop reason: SDUPTHER

## 2021-04-15 DIAGNOSIS — Z11.9 ENCOUNTER FOR SCREENING FOR INFECTIOUS AND PARASITIC DISEASES, UNSPECIFIED: Primary | ICD-10-CM

## 2021-04-16 DIAGNOSIS — Z11.9 ENCOUNTER FOR SCREENING FOR INFECTIOUS AND PARASITIC DISEASES, UNSPECIFIED: ICD-10-CM

## 2021-04-16 PROCEDURE — U0005 INFEC AGEN DETEC AMPLI PROBE: HCPCS | Performed by: FAMILY MEDICINE

## 2021-04-16 PROCEDURE — U0003 INFECTIOUS AGENT DETECTION BY NUCLEIC ACID (DNA OR RNA); SEVERE ACUTE RESPIRATORY SYNDROME CORONAVIRUS 2 (SARS-COV-2) (CORONAVIRUS DISEASE [COVID-19]), AMPLIFIED PROBE TECHNIQUE, MAKING USE OF HIGH THROUGHPUT TECHNOLOGIES AS DESCRIBED BY CMS-2020-01-R: HCPCS | Performed by: FAMILY MEDICINE

## 2021-04-17 ENCOUNTER — HOSPITAL ENCOUNTER (OUTPATIENT)
Dept: CT IMAGING | Facility: HOSPITAL | Age: 31
Discharge: HOME | End: 2021-04-17
Payer: COMMERCIAL

## 2021-04-17 DIAGNOSIS — I82.A11 ACUTE DEEP VEIN THROMBOSIS (DVT) OF AXILLARY VEIN OF RIGHT UPPER EXTREMITY (HCC): ICD-10-CM

## 2021-04-17 LAB — SARS-COV-2 RNA RESP QL NAA+PROBE: NEGATIVE

## 2021-04-17 PROCEDURE — 71275 CT ANGIOGRAPHY CHEST: CPT

## 2021-04-17 RX ADMIN — IOHEXOL 100 ML: 350 INJECTION, SOLUTION INTRAVENOUS at 12:10

## 2021-04-20 ENCOUNTER — PROCEDURE VISIT (OUTPATIENT)
Dept: OBGYN CLINIC | Facility: CLINIC | Age: 31
End: 2021-04-20
Payer: COMMERCIAL

## 2021-04-20 VITALS — WEIGHT: 132.6 LBS | SYSTOLIC BLOOD PRESSURE: 130 MMHG | BODY MASS INDEX: 22.76 KG/M2 | DIASTOLIC BLOOD PRESSURE: 74 MMHG

## 2021-04-20 DIAGNOSIS — Z30.430 ENCOUNTER FOR IUD INSERTION: Primary | ICD-10-CM

## 2021-04-20 DIAGNOSIS — Z79.01 CURRENT USE OF LONG TERM ANTICOAGULATION: ICD-10-CM

## 2021-04-20 DIAGNOSIS — I82.A11 ACUTE DEEP VEIN THROMBOSIS (DVT) OF AXILLARY VEIN OF RIGHT UPPER EXTREMITY (HCC): ICD-10-CM

## 2021-04-20 PROCEDURE — 58300 INSERT INTRAUTERINE DEVICE: CPT | Performed by: OBSTETRICS & GYNECOLOGY

## 2021-04-20 RX ORDER — LEVONORGESTREL 19.5 MG/1
1 INTRAUTERINE DEVICE INTRAUTERINE ONCE
COMMUNITY

## 2021-04-22 ENCOUNTER — OFFICE VISIT (OUTPATIENT)
Dept: VASCULAR SURGERY | Facility: CLINIC | Age: 31
End: 2021-04-22
Payer: COMMERCIAL

## 2021-04-22 ENCOUNTER — DOCUMENTATION (OUTPATIENT)
Dept: OTHER | Facility: HOSPITAL | Age: 31
End: 2021-04-22

## 2021-04-22 VITALS
RESPIRATION RATE: 18 BRPM | HEIGHT: 64 IN | BODY MASS INDEX: 22.53 KG/M2 | HEART RATE: 101 BPM | DIASTOLIC BLOOD PRESSURE: 72 MMHG | SYSTOLIC BLOOD PRESSURE: 110 MMHG | WEIGHT: 132 LBS

## 2021-04-22 DIAGNOSIS — I82.A11 ACUTE DEEP VEIN THROMBOSIS (DVT) OF AXILLARY VEIN OF RIGHT UPPER EXTREMITY (HCC): ICD-10-CM

## 2021-04-22 DIAGNOSIS — I82.A11 ACUTE DEEP VEIN THROMBOSIS (DVT) OF AXILLARY VEIN OF RIGHT UPPER EXTREMITY (HCC): Primary | ICD-10-CM

## 2021-04-22 PROCEDURE — 99213 OFFICE O/P EST LOW 20 MIN: CPT | Performed by: SURGERY

## 2021-04-22 RX ORDER — WARFARIN SODIUM 1 MG/1
TABLET ORAL
Qty: 60 TABLET | Refills: 5 | Status: SHIPPED | OUTPATIENT
Start: 2021-04-22

## 2021-04-22 NOTE — LETTER
April 22, 2021     Qasim Amezquita MD  [address] [place] [address]  [id number]  [id number]    Patient: Monique Dominguez   YOB: 1990   Date of Visit: 4/22/2021       Dear Dr Suraj Baig: Thank you for referring Monique Dominguez to me for evaluation  Below are the relevant portions of my assessment and plan of care  Diagnoses and all orders for this visit:    Acute deep vein thrombosis (DVT) of axillary vein of right upper extremity (HCC)  Right upper extremity axillary/subclavian vein/brachial vein DVT and superficial thrombophlebitis, diagnosed in 2/2021 on coumadin  Initial symptoms of swelling and pain have mostly resolved  She notices that her right arm is slightly larger than the left arm but otherwise it does not bother her  She tested positive for lupus anticoagulant on her hypercoagulability work-up and is following with hematology with plans for repeat testing in the coming months       CTA chest reviewed in detail - right axillary and subclavian vein DVT noted, no significant compression in the thoracic outlet by my interpretation, none reported by radiology     -Discussed pathophysiology of upper extremity DVT, possible etiologies  She has a potential hypercoagulable risk factor with +lupus anticoagulant  She will likely require long term anticoagulation given these findings  -Venous thoracic outlet syndrome is a less likely etiology  She does not engage in strenuous upper extremity activities and does not need to participate in rigorous upper extremity sports  She does sleep at times with her arms raised  Advised that it may be best to avoid sleeping for prolonged periods of time in this position as it may potentially make her more prone to venous thrombosis in setting of hypercoagulable risk factor and now extensive right upper extremity DVTs  -Continue follow-up with hematology as directed  Will follow-up in 6 months to re-evaluate symptoms   No further testing recommended unless she develops new worsening upper extremity symptoms, swelling or pain  If you have questions, please do not hesitate to call me  I look forward to following Mima Nieves along with you           Sincerely,        Lebron Erickson MD        CC: Alba Bolanos MD

## 2021-04-22 NOTE — ASSESSMENT & PLAN NOTE
Right upper extremity axillary/subclavian vein/brachial vein DVT and superficial thrombophlebitis, diagnosed in 2/2021 on coumadin  Initial symptoms of swelling and pain have mostly resolved  She notices that her right arm is slightly larger than the left arm but otherwise it does not bother her  She tested positive for lupus anticoagulant on her hypercoagulability work-up and is following with hematology with plans for repeat testing in the coming months  CTA chest reviewed in detail - right axillary and subclavian vein DVT noted, no significant compression in the thoracic outlet by my interpretation, none reported by radiology    -Discussed pathophysiology of upper extremity DVT, possible etiologies  She has a potential hypercoagulable risk factor with +lupus anticoagulant  She will likely require long term anticoagulation given these findings  -Venous thoracic outlet syndrome is a less likely etiology  She does not engage in strenuous upper extremity activities and does not need to participate in rigorous upper extremity sports  She does sleep at times with her arms raised  Advised that it may be best to avoid sleeping for prolonged periods of time in this position as it may potentially make her more prone to venous thrombosis in setting of hypercoagulable risk factor and now extensive right upper extremity DVTs  -Continue follow-up with hematology as directed  Will follow-up in 6 months to re-evaluate symptoms  No further testing recommended unless she develops new worsening upper extremity symptoms, swelling or pain

## 2021-04-22 NOTE — PATIENT INSTRUCTIONS
Acute deep vein thrombosis (DVT) of axillary vein of right upper extremity (HCC)  Right upper extremity axillary/subclavian vein/brachial vein DVT and superficial thrombophlebitis, diagnosed in 2/2021 on coumadin  Initial symptoms of swelling and pain have mostly resolved  She notices that her right arm is slightly larger than the left arm but otherwise it does not bother her  She tested positive for lupus anticoagulant on her hypercoagulability work-up and is following with hematology with plans for repeat testing in the coming months       CTA chest reviewed in detail - right axillary and subclavian vein DVT noted, no significant compression in the thoracic outlet by my interpretation, none reported by radiology     -Discussed pathophysiology of upper extremity DVT, possible etiologies  She has a potential hypercoagulable risk factor with +lupus anticoagulant  She will likely require long term anticoagulation given these findings  -Venous thoracic outlet syndrome is a less likely etiology  She does not engage in strenuous upper extremity activities and does not need to participate in rigorous upper extremity sports  She does sleep at times with her arms raised  Advised that it may be best to avoid sleeping for prolonged periods of time in this position as it may potentially make her more prone to venous thrombosis in setting of hypercoagulable risk factor and now extensive right upper extremity DVTs  -Continue follow-up with hematology as directed  Will follow-up in 6 months to re-evaluate symptoms  No further testing recommended unless she develops new worsening upper extremity symptoms, swelling or pain

## 2021-04-22 NOTE — PROGRESS NOTES
Assessment/Plan:    Acute deep vein thrombosis (DVT) of axillary vein of right upper extremity (HCC)  Right upper extremity axillary/subclavian vein/brachial vein DVT and superficial thrombophlebitis, diagnosed in 2/2021 on coumadin  Initial symptoms of swelling and pain have mostly resolved  She notices that her right arm is slightly larger than the left arm but otherwise it does not bother her  She tested positive for lupus anticoagulant on her hypercoagulability work-up and is following with hematology with plans for repeat testing in the coming months  CTA chest reviewed in detail - right axillary and subclavian vein DVT noted, no significant compression in the thoracic outlet by my interpretation, none reported by radiology    -Discussed pathophysiology of upper extremity DVT, possible etiologies  She has a potential hypercoagulable risk factor with +lupus anticoagulant  She will likely require long term anticoagulation given these findings  -Venous thoracic outlet syndrome is a less likely etiology  She does not engage in strenuous upper extremity activities and does not need to participate in rigorous upper extremity sports  She does sleep at times with her arms raised  Advised that it may be best to avoid sleeping for prolonged periods of time in this position as it may potentially make her more prone to venous thrombosis in setting of hypercoagulable risk factor and now extensive right upper extremity DVTs  -Continue follow-up with hematology as directed  Will follow-up in 6 months to re-evaluate symptoms  No further testing recommended unless she develops new worsening upper extremity symptoms, swelling or pain         Diagnoses and all orders for this visit:    Acute deep vein thrombosis (DVT) of axillary vein of right upper extremity ([address] )        I have spent 15 minutes with Patient  today in which greater than 50% of this time was spent in counseling/coordination of care regarding Intructions for management, Importance of tx compliance and Impressions  Subjective:      Patient ID: Petra Correa is a 35 y o  female  Patient had a CTA of the chest on 4/17/21  Pt denies arm pain, warmth, or loss of motion  Pt has minimal swelling  Pt is on Warfarin  HPI  Ms Freeman Salter is a 33yo female with history of extensive right upper extremity DVT on coumadin, +lupus anticoagulant who presents for follow-up to review her CTA chest  She has no significant ongoing upper extremity discomfort  She notices mild swelling of the right arm compared to the left arm but it does not bother her  She denies any paresthesias or pain  She is taking coumadin  She has upcoming follow-up with repeat testing arranged with hematology in the coming months  The following portions of the patient's history were reviewed and updated as appropriate: allergies, current medications, past family history, past medical history, past social history, past surgical history and problem list     Review of Systems   Constitutional: Negative  HENT: Negative  Eyes: Negative  Respiratory: Negative  Cardiovascular: Negative  Gastrointestinal: Negative  Endocrine: Negative  Genitourinary: Negative  Musculoskeletal: Negative  Allergic/Immunologic: Negative  Neurological: Negative  Hematological: Negative  Psychiatric/Behavioral: Negative  I have personally reviewed the ROS entered by MA and agree as documented  Objective:      /72 (BP Location: Left arm, Patient Position: Sitting)   Pulse 101   Resp 18   Ht 5' 4" (1 626 m)   Wt 59 9 kg (132 lb)   LMP 03/31/2021 Comment: heavy period  BMI 22 66 kg/m²          Physical Exam  Constitutional:       Appearance: Normal appearance  HENT:      Head: Normocephalic and atraumatic  Neck:      Musculoskeletal: Normal range of motion and neck supple  Cardiovascular:      Rate and Rhythm: Normal rate     Pulmonary:      Effort: Pulmonary effort is normal    Abdominal:      Palpations: Abdomen is soft  Musculoskeletal: Normal range of motion  General: No tenderness  Comments: Minimal right arm swelling   Skin:     General: Skin is warm and dry  Capillary Refill: Capillary refill takes less than 2 seconds  Neurological:      General: No focal deficit present  Mental Status: She is alert and oriented to person, place, and time  Psychiatric:         Mood and Affect: Mood normal          Behavior: Behavior normal          Thought Content:  Thought content normal          Judgment: Judgment normal

## 2021-04-23 ENCOUNTER — ANTICOAG VISIT (OUTPATIENT)
Dept: FAMILY MEDICINE CLINIC | Facility: CLINIC | Age: 31
End: 2021-04-23

## 2021-04-23 ENCOUNTER — APPOINTMENT (OUTPATIENT)
Dept: LAB | Facility: CLINIC | Age: 31
End: 2021-04-23
Payer: COMMERCIAL

## 2021-04-23 DIAGNOSIS — I82.A11 ACUTE DEEP VEIN THROMBOSIS (DVT) OF AXILLARY VEIN OF RIGHT UPPER EXTREMITY (HCC): ICD-10-CM

## 2021-04-23 LAB
INR PPP: 1.79 (ref 0.84–1.19)
PROTHROMBIN TIME: 20.8 SECONDS (ref 11.6–14.5)

## 2021-04-23 PROCEDURE — 85610 PROTHROMBIN TIME: CPT

## 2021-04-23 PROCEDURE — 36415 COLL VENOUS BLD VENIPUNCTURE: CPT

## 2021-04-23 NOTE — PROGRESS NOTES
Reviewed results with patient  INR not at goal  Will increase coumadin dose to 7mg every day for a total of 49mg weekly  Repeat labs in one week

## 2021-04-30 ENCOUNTER — TELEPHONE (OUTPATIENT)
Dept: HEMATOLOGY ONCOLOGY | Facility: CLINIC | Age: 31
End: 2021-04-30

## 2021-04-30 ENCOUNTER — LAB (OUTPATIENT)
Dept: LAB | Facility: CLINIC | Age: 31
End: 2021-04-30
Payer: COMMERCIAL

## 2021-04-30 DIAGNOSIS — I82.A11 ACUTE DEEP VEIN THROMBOSIS (DVT) OF AXILLARY VEIN OF RIGHT UPPER EXTREMITY (HCC): ICD-10-CM

## 2021-04-30 LAB
INR PPP: 1.81 (ref 0.84–1.19)
PROTHROMBIN TIME: 20.9 SECONDS (ref 11.6–14.5)

## 2021-04-30 PROCEDURE — 85610 PROTHROMBIN TIME: CPT

## 2021-04-30 PROCEDURE — 36415 COLL VENOUS BLD VENIPUNCTURE: CPT

## 2021-04-30 NOTE — TELEPHONE ENCOUNTER
Reschedule Appointment     Who is calling in Patient    Doctor Appointment Scheduled with Dr Lopez    Original date and time 6-4-2021 @ 9:40am    New date and time 6-11-21 @ 2:40pm   Location [place]    Patient verbalized understanding

## 2021-04-30 NOTE — TELEPHONE ENCOUNTER
Called pt regarding apt on Friday 05/28/21 at 9:00 a m  w/Dr Brenda Keller at the [address] [place] [address]  Left message that unfortunately we needed to reschedule the apt  Rescheduled apt for Friday 06/04/21 at 9:40 a m  w/Dr Brenda Keller at the [address] [place] [address]  Asked pt to call the office if new apt doesn't work, [phone]

## 2021-05-03 ENCOUNTER — ANTICOAG VISIT (OUTPATIENT)
Dept: FAMILY MEDICINE CLINIC | Facility: CLINIC | Age: 31
End: 2021-05-03

## 2021-05-03 DIAGNOSIS — I82.A11 ACUTE DEEP VEIN THROMBOSIS (DVT) OF AXILLARY VEIN OF RIGHT UPPER EXTREMITY (HCC): ICD-10-CM

## 2021-05-07 ENCOUNTER — LAB (OUTPATIENT)
Dept: LAB | Facility: CLINIC | Age: 31
End: 2021-05-07
Payer: COMMERCIAL

## 2021-05-07 ENCOUNTER — TRANSCRIBE ORDERS (OUTPATIENT)
Dept: LAB | Facility: CLINIC | Age: 31
End: 2021-05-07

## 2021-05-07 DIAGNOSIS — I82.A11 ACUTE DEEP VEIN THROMBOSIS (DVT) OF AXILLARY VEIN OF RIGHT UPPER EXTREMITY (HCC): ICD-10-CM

## 2021-05-07 DIAGNOSIS — I82.A11 ACUTE DEEP VEIN THROMBOSIS (DVT) OF AXILLARY VEIN OF RIGHT UPPER EXTREMITY (HCC): Primary | ICD-10-CM

## 2021-05-07 LAB
INR PPP: 2.05 (ref 0.84–1.19)
PROTHROMBIN TIME: 23 SECONDS (ref 11.6–14.5)

## 2021-05-07 PROCEDURE — 85610 PROTHROMBIN TIME: CPT

## 2021-05-07 PROCEDURE — 36415 COLL VENOUS BLD VENIPUNCTURE: CPT

## 2021-05-08 ENCOUNTER — ANTICOAG VISIT (OUTPATIENT)
Dept: OTHER | Facility: HOSPITAL | Age: 31
End: 2021-05-08

## 2021-05-08 DIAGNOSIS — I82.A11 ACUTE DEEP VEIN THROMBOSIS (DVT) OF AXILLARY VEIN OF RIGHT UPPER EXTREMITY (HCC): ICD-10-CM

## 2021-05-18 PROBLEM — Z79.01 CURRENT USE OF LONG TERM ANTICOAGULATION: Status: ACTIVE | Noted: 2021-05-18

## 2021-05-18 NOTE — PROGRESS NOTES
Assessment/Plan:  Sophy Sherwood 4/20/21  S/p DVT while using Nuvaring  RTO prn , annual    Diagnoses and all orders for this visit:    IUD check up    Acute deep vein thrombosis (DVT) of axillary vein of right upper extremity ([facility] )    Current use of long term anticoagulation              Subjective:        Patient ID: Todd Parker is a 35 y o  female  Maximino Samson is here for her IUD follow-up  She experienced some spotting and mild cramping follow the insertion  Her menses was 1 week early and much lighter than the previous 1  She continues to take  Coumadin  She had some spotting after intercourse once but there were no other problems  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 working at [employer] August 1st  Her partner is a   The following portions of the patient's history were reviewed and updated as appropriate: She  has a past medical history of DVT (deep venous thrombosis) ([facility] ) (02/08/2021)  Patient Active Problem List    Diagnosis Date Noted    Current use of long term anticoagulation 05/18/2021    Acute deep vein thrombosis (DVT) of axillary vein of right upper extremity ([facility] ) 03/26/2021    Viral infection, unspecified 11/24/2020    Exposure to COVID-19 virus 11/24/2020    Fatigue 07/22/2020    Acne vulgaris 07/22/2020    Hyperpigmentation of skin 07/22/2020    Annual physical exam 10/30/2018     She  has no past surgical history on file  Her family history includes Benign prostatic hyperplasia in her father; Endometriosis in her sister; Hypertension in her mother; No Known Problems in her brother and sister; Stroke in her maternal grandmother  She  reports that she has never smoked  She has never used smokeless tobacco  She reports current alcohol use  She reports that she does not use drugs    Current Outpatient Medications   Medication Sig Dispense Refill    acetaminophen-codeine (TYLENOL #2) 300-15 MG per tablet Take 1 tablet by mouth every 4 (four) hours as needed for moderate pain (Patient not taking: Reported on 4/22/2021) 30 tablet 0    clindamycin (CLINDAGEL) 1 % gel Apply topically 2 (two) times a day 30 g 3    cyclobenzaprine (FLEXERIL) 10 mg tablet Take 1 tablet (10 mg total) by mouth 3 (three) times a day as needed for muscle spasms 60 tablet 0    levonorgestrel (Kyleena) 19 5 MG intrauterine device 1 Intra Uterine Device by Intrauterine route once      warfarin (COUMADIN) 1 mg tablet BID 60 tablet 5    warfarin (COUMADIN) 5 mg tablet Take 1 tablet daily 30 tablet 11     No current facility-administered medications for this visit  Current Outpatient Medications on File Prior to Visit   Medication Sig    acetaminophen-codeine (TYLENOL #2) 300-15 MG per tablet Take 1 tablet by mouth every 4 (four) hours as needed for moderate pain (Patient not taking: Reported on 4/22/2021)    clindamycin (CLINDAGEL) 1 % gel Apply topically 2 (two) times a day    cyclobenzaprine (FLEXERIL) 10 mg tablet Take 1 tablet (10 mg total) by mouth 3 (three) times a day as needed for muscle spasms    levonorgestrel (Kyleena) 19 5 MG intrauterine device 1 Intra Uterine Device by Intrauterine route once    warfarin (COUMADIN) 1 mg tablet BID    warfarin (COUMADIN) 5 mg tablet Take 1 tablet daily     No current facility-administered medications on file prior to visit  She has No Known Allergies       Review of Systems   Constitutional: Negative for chills, fatigue and fever  Respiratory: Negative for shortness of breath  Cardiovascular: Negative for chest pain  Gastrointestinal: Negative for abdominal pain and nausea  Genitourinary: Negative for difficulty urinating, dyspareunia, menstrual problem, vaginal bleeding, vaginal discharge and vaginal pain  Musculoskeletal: Negative for back pain  Skin: Negative for rash  Psychiatric/Behavioral: Negative for dysphoric mood  Objective:     There were no vitals filed for this visit          Physical Exam  Vitals signs and nursing note reviewed  Constitutional:       General: She is not in acute distress  Appearance: Normal appearance  She is well-developed  Eyes:      General: No scleral icterus  Right eye: No discharge  Left eye: No discharge  Extraocular Movements: Extraocular movements intact  Pulmonary:      Effort: Pulmonary effort is normal    Genitourinary:     Vagina: Normal       Cervix: No discharge or friability  Comments: IUD string present  There is moderate cervical a version and a yellowish discharge coming from the cervix  Neurological:      Mental Status: She is alert and oriented to person, place, and time  Psychiatric:         Mood and Affect: Mood normal          Behavior: Behavior normal          Thought Content:  Thought content normal          Judgment: Judgment normal

## 2021-05-19 ENCOUNTER — OFFICE VISIT (OUTPATIENT)
Dept: OBGYN CLINIC | Facility: CLINIC | Age: 31
End: 2021-05-19
Payer: COMMERCIAL

## 2021-05-19 VITALS — SYSTOLIC BLOOD PRESSURE: 118 MMHG | BODY MASS INDEX: 22.62 KG/M2 | WEIGHT: 131.8 LBS | DIASTOLIC BLOOD PRESSURE: 60 MMHG

## 2021-05-19 DIAGNOSIS — Z30.431 IUD CHECK UP: Primary | ICD-10-CM

## 2021-05-19 DIAGNOSIS — I82.A11 ACUTE DEEP VEIN THROMBOSIS (DVT) OF AXILLARY VEIN OF RIGHT UPPER EXTREMITY (HCC): ICD-10-CM

## 2021-05-19 DIAGNOSIS — Z79.01 CURRENT USE OF LONG TERM ANTICOAGULATION: ICD-10-CM

## 2021-05-19 PROCEDURE — 99212 OFFICE O/P EST SF 10 MIN: CPT | Performed by: OBSTETRICS & GYNECOLOGY

## 2021-05-21 ENCOUNTER — TRANSCRIBE ORDERS (OUTPATIENT)
Dept: LAB | Facility: CLINIC | Age: 31
End: 2021-05-21

## 2021-05-21 ENCOUNTER — LAB (OUTPATIENT)
Dept: LAB | Facility: CLINIC | Age: 31
End: 2021-05-21
Payer: COMMERCIAL

## 2021-05-21 DIAGNOSIS — I82.621 ACUTE DEEP VEIN THROMBOSIS (DVT) OF BRACHIAL VEIN OF RIGHT UPPER EXTREMITY (HCC): ICD-10-CM

## 2021-05-21 DIAGNOSIS — I82.621 ACUTE DEEP VEIN THROMBOSIS (DVT) OF BRACHIAL VEIN OF RIGHT UPPER EXTREMITY (HCC): Primary | ICD-10-CM

## 2021-05-21 LAB
INR PPP: 2.49 (ref 0.84–1.19)
PROTHROMBIN TIME: 26.8 SECONDS (ref 11.6–14.5)

## 2021-05-21 PROCEDURE — 36415 COLL VENOUS BLD VENIPUNCTURE: CPT

## 2021-05-21 PROCEDURE — 85610 PROTHROMBIN TIME: CPT

## 2021-05-22 DIAGNOSIS — I82.A11 ACUTE DEEP VEIN THROMBOSIS (DVT) OF AXILLARY VEIN OF RIGHT UPPER EXTREMITY (HCC): Primary | ICD-10-CM

## 2021-05-29 ENCOUNTER — APPOINTMENT (OUTPATIENT)
Dept: LAB | Facility: HOSPITAL | Age: 31
End: 2021-05-29

## 2021-05-29 ENCOUNTER — APPOINTMENT (OUTPATIENT)
Dept: LAB | Facility: HOSPITAL | Age: 31
End: 2021-05-29
Attending: INTERNAL MEDICINE
Payer: COMMERCIAL

## 2021-05-29 ENCOUNTER — TRANSCRIBE ORDERS (OUTPATIENT)
Dept: LAB | Facility: HOSPITAL | Age: 31
End: 2021-05-29

## 2021-05-29 DIAGNOSIS — Z00.8 HEALTH EXAMINATION IN POPULATION SURVEYS: Primary | ICD-10-CM

## 2021-05-29 DIAGNOSIS — Z00.8 HEALTH EXAMINATION IN POPULATION SURVEYS: ICD-10-CM

## 2021-05-29 DIAGNOSIS — I82.A11 ACUTE DEEP VEIN THROMBOSIS (DVT) OF AXILLARY VEIN OF RIGHT UPPER EXTREMITY (HCC): ICD-10-CM

## 2021-05-29 LAB
ALBUMIN SERPL BCP-MCNC: 4.2 G/DL (ref 3–5.2)
ALP SERPL-CCNC: 76 U/L (ref 43–122)
ALT SERPL W P-5'-P-CCNC: 14 U/L
ANION GAP SERPL CALCULATED.3IONS-SCNC: 8 MMOL/L (ref 5–14)
AST SERPL W P-5'-P-CCNC: 25 U/L (ref 14–36)
BASOPHILS # BLD AUTO: 0 THOUSANDS/ΜL (ref 0–0.1)
BASOPHILS NFR BLD AUTO: 1 % (ref 0–1)
BILIRUB SERPL-MCNC: 0.56 MG/DL
BUN SERPL-MCNC: 12 MG/DL (ref 5–25)
CALCIUM SERPL-MCNC: 9.1 MG/DL (ref 8.4–10.2)
CHLORIDE SERPL-SCNC: 106 MMOL/L (ref 97–108)
CHOLEST SERPL-MCNC: 166 MG/DL
CO2 SERPL-SCNC: 23 MMOL/L (ref 22–30)
CREAT SERPL-MCNC: 0.69 MG/DL (ref 0.6–1.2)
CRP SERPL QL: <5 MG/L
D DIMER PPP FEU-MCNC: 0.28 UG/ML FEU
EOSINOPHIL # BLD AUTO: 0.2 THOUSAND/ΜL (ref 0–0.4)
EOSINOPHIL NFR BLD AUTO: 4 % (ref 0–6)
ERYTHROCYTE [DISTWIDTH] IN BLOOD BY AUTOMATED COUNT: 14.7 %
ERYTHROCYTE [SEDIMENTATION RATE] IN BLOOD: 13 MM/HOUR (ref 0–19)
EST. AVERAGE GLUCOSE BLD GHB EST-MCNC: 111 MG/DL
GFR SERPL CREATININE-BSD FRML MDRD: 136 ML/MIN/1.73SQ M
GLUCOSE P FAST SERPL-MCNC: 86 MG/DL (ref 70–99)
HBA1C MFR BLD: 5.5 %
HCT VFR BLD AUTO: 35.4 % (ref 36–46)
HDLC SERPL-MCNC: 47 MG/DL
HGB BLD-MCNC: 11.5 G/DL (ref 12–16)
LDH SERPL-CCNC: 531 U/L (ref 313–618)
LDLC SERPL CALC-MCNC: 107 MG/DL
LYMPHOCYTES # BLD AUTO: 1.5 THOUSANDS/ΜL (ref 0.5–4)
LYMPHOCYTES NFR BLD AUTO: 31 % (ref 25–45)
MCH RBC QN AUTO: 25.9 PG (ref 26–34)
MCHC RBC AUTO-ENTMCNC: 32.4 G/DL (ref 31–36)
MCV RBC AUTO: 80 FL (ref 80–100)
MONOCYTES # BLD AUTO: 0.4 THOUSAND/ΜL (ref 0.2–0.9)
MONOCYTES NFR BLD AUTO: 8 % (ref 1–10)
NEUTROPHILS # BLD AUTO: 2.9 THOUSANDS/ΜL (ref 1.8–7.8)
NEUTS SEG NFR BLD AUTO: 57 % (ref 45–65)
NONHDLC SERPL-MCNC: 119 MG/DL
PLATELET # BLD AUTO: 340 THOUSANDS/UL (ref 150–450)
PMV BLD AUTO: 7.9 FL (ref 8.9–12.7)
POTASSIUM SERPL-SCNC: 4 MMOL/L (ref 3.6–5)
PROT SERPL-MCNC: 7.7 G/DL (ref 5.9–8.4)
RBC # BLD AUTO: 4.42 MILLION/UL (ref 4–5.2)
RHEUMATOID FACT SER QL LA: NEGATIVE
SODIUM SERPL-SCNC: 137 MMOL/L (ref 137–147)
TRIGL SERPL-MCNC: 59 MG/DL
WBC # BLD AUTO: 5 THOUSAND/UL (ref 4.5–11)

## 2021-05-29 PROCEDURE — 80053 COMPREHEN METABOLIC PANEL: CPT

## 2021-05-29 PROCEDURE — 85670 THROMBIN TIME PLASMA: CPT

## 2021-05-29 PROCEDURE — 85306 CLOT INHIBIT PROT S FREE: CPT

## 2021-05-29 PROCEDURE — 85305 CLOT INHIBIT PROT S TOTAL: CPT

## 2021-05-29 PROCEDURE — 86147 CARDIOLIPIN ANTIBODY EA IG: CPT

## 2021-05-29 PROCEDURE — 85613 RUSSELL VIPER VENOM DILUTED: CPT

## 2021-05-29 PROCEDURE — 85705 THROMBOPLASTIN INHIBITION: CPT

## 2021-05-29 PROCEDURE — 85302 CLOT INHIBIT PROT C ANTIGEN: CPT

## 2021-05-29 PROCEDURE — 83615 LACTATE (LD) (LDH) ENZYME: CPT

## 2021-05-29 PROCEDURE — 85732 THROMBOPLASTIN TIME PARTIAL: CPT

## 2021-05-29 PROCEDURE — 86038 ANTINUCLEAR ANTIBODIES: CPT

## 2021-05-29 PROCEDURE — 85303 CLOT INHIBIT PROT C ACTIVITY: CPT

## 2021-05-29 PROCEDURE — 83036 HEMOGLOBIN GLYCOSYLATED A1C: CPT

## 2021-05-29 PROCEDURE — 85025 COMPLETE CBC W/AUTO DIFF WBC: CPT

## 2021-05-29 PROCEDURE — 85379 FIBRIN DEGRADATION QUANT: CPT

## 2021-05-29 PROCEDURE — 85652 RBC SED RATE AUTOMATED: CPT

## 2021-05-29 PROCEDURE — 86430 RHEUMATOID FACTOR TEST QUAL: CPT

## 2021-05-29 PROCEDURE — 36415 COLL VENOUS BLD VENIPUNCTURE: CPT

## 2021-05-29 PROCEDURE — 80061 LIPID PANEL: CPT

## 2021-05-29 PROCEDURE — 86140 C-REACTIVE PROTEIN: CPT

## 2021-05-31 LAB
PROT C AG ACT/NOR PPP IA: 16 % OF NORMAL (ref 60–150)
PROT S ACT/NOR PPP: <10 % (ref 68–108)
RYE IGE QN: NEGATIVE

## 2021-06-01 LAB
APTT SCREEN TO CONFIRM RATIO: 1.13 RATIO (ref 0–1.4)
CARDIOLIPIN IGA SER IA-ACNC: 3.3
CARDIOLIPIN IGG SER IA-ACNC: 4.1
CARDIOLIPIN IGM SER IA-ACNC: 2.4
CONFIRM APTT/NORMAL: 87.7 SEC (ref 0–55)
DRVVT IMM 1:2 NP PPP: 39.3 SEC (ref 0–40.4)
LA PPP-IMP: ABNORMAL
PROT C AG PPP IA-ACNC: 45 % (ref 60–150)
PROT S ACT/NOR PPP: <10 % (ref 57–157)
PROT S PPP-ACNC: <25 % (ref 60–150)
SCREEN APTT: 45.1 SEC (ref 0–51.9)
SCREEN DRVVT: 67.2 SEC (ref 0–47)
THROMBIN TIME: 19.1 SEC (ref 0–23)

## 2021-06-10 ENCOUNTER — APPOINTMENT (OUTPATIENT)
Dept: LAB | Facility: CLINIC | Age: 31
End: 2021-06-10
Payer: COMMERCIAL

## 2021-06-11 ENCOUNTER — ANTICOAG VISIT (OUTPATIENT)
Dept: FAMILY MEDICINE CLINIC | Facility: CLINIC | Age: 31
End: 2021-06-11

## 2021-06-11 ENCOUNTER — OFFICE VISIT (OUTPATIENT)
Dept: HEMATOLOGY ONCOLOGY | Facility: CLINIC | Age: 31
End: 2021-06-11
Payer: COMMERCIAL

## 2021-06-11 VITALS
BODY MASS INDEX: 22.88 KG/M2 | HEART RATE: 51 BPM | HEIGHT: 64 IN | TEMPERATURE: 97.2 F | OXYGEN SATURATION: 97 % | DIASTOLIC BLOOD PRESSURE: 90 MMHG | SYSTOLIC BLOOD PRESSURE: 142 MMHG | RESPIRATION RATE: 18 BRPM | WEIGHT: 134 LBS

## 2021-06-11 DIAGNOSIS — I82.A11 ACUTE DEEP VEIN THROMBOSIS (DVT) OF AXILLARY VEIN OF RIGHT UPPER EXTREMITY (HCC): Primary | ICD-10-CM

## 2021-06-11 DIAGNOSIS — I82.A11 ACUTE DEEP VEIN THROMBOSIS (DVT) OF AXILLARY VEIN OF RIGHT UPPER EXTREMITY (HCC): ICD-10-CM

## 2021-06-11 DIAGNOSIS — D50.9 IRON DEFICIENCY ANEMIA, UNSPECIFIED IRON DEFICIENCY ANEMIA TYPE: ICD-10-CM

## 2021-06-11 PROCEDURE — 99214 OFFICE O/P EST MOD 30 MIN: CPT | Performed by: INTERNAL MEDICINE

## 2021-06-11 NOTE — PROGRESS NOTES
Hematology/Oncology Outpatient Follow-up  Todd Parker 35 y o  female 1990 [id number]    Date:  6/11/2021    Assessment and Plan:  1  Acute deep vein thrombosis (DVT) of axillary vein of right upper extremity ([address] )    I had a lengthy discussion with the patient regarding her unprovoked right upper extremity deep vein thrombosis which was diagnosed in February  The thoracic outlet syndrome was ruled out with the recent CT scan of the chest   She will be following up with the vascular surgical team in September  We discussed the length of anticoagulation and the potential risk after stopping the Coumadin  The final decision was to stop the Coumadin after at least 6 months worth of anticoagulation  We will then repeat the blood work which was done recently including the lupus anticoagulant and the protein S and protein C levels which are altered due to the fact that she is currently on Coumadin  We will also check the D-dimer level and make a final decision regarding further anticoagulation versus close observation  The patient was educated about the need for anticoagulation with any future pregnancy  She also was educated about the fact that Coumadin needs to be switched to low-molecular weight heparin during any future pregnancy to avoid complications  - Cardiolipin antibody; Future  - Lupus anticoagulant; Future  - Beta-2 glycoprotein antibodies; Future  - Protein S activity; Future  - Protein C antigen, total; Future  - Protein C activity; Future  - Protein S Antigen, Total; Future  - D-dimer, quantitative; Future    2  Iron deficiency anemia, unspecified iron deficiency anemia type  The patient seems to have a borderline low hemoglobin level with the microcytic red cells  She is most likely developing iron deficiency  Due to her heavy menstrual bleeding while on anticoagulation  The patient stated that she is taking oral iron supplements on every other day basis    We will check her iron panel prior to her next visit and see if she would benefit rather from IV iron  - CBC and differential; Future  - Comprehensive metabolic panel; Future  - Iron Panel (Includes Ferritin, Iron Sat%, Iron, and TIBC); Future  - Ferritin; Future      HPI:   This is a very pleasant 26-year-old female 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  She does not seem to have any obvious past medical history or family history for hypercoagulable disorder  She was never pregnant and denied any miscarriages  The patient stated that she had swelling and pain of the medial aspect of her right elbow and right arm around the 8th of February 2021  She was then evaluated with a Doppler ultrasound on 02/08/2021 which showed evidence of acute occlusive, deep vein thrombosis in the distal axillary and proximal brachial veins  There was also evidence of acute superficial thrombophlebitis in the cephalic vein at the distal upper arm and proximal and mid forearm and in the basilic vein at the mid and distal upper arm and in the median cubital vein  The left upper extremity was negative for any clotting  The patient  Was then evaluated with thrombosis panel before she was started on anticoagulation with Eliquis 10 mg twice a day for a week then Eliquis 5 mg twice a day  Which she continued to take on a daily basis without any complications or bleeding  She did notice heavy menstrual bleeding after the start of Eliquis  The thrombosis panel came back positive for lupus anticoagulant  The beta 2 glycoprotein antibodies were within normal range  The anti cardiolipin IgM level was 14 which is in the indeterminate range  The protein  S activity level was 10% the protein S antigen level was 39% and protein S antigen free level was 28%  Protein C activity level was 97%  Interval history:   the patient came today for a follow-up visit    She stated that she had the her anticoagulation switched from Eliquis to Coumadin after her initial consultation with us on  03/26/2021  She also was seen by the thoracic surgical team ordered a CT scan of the chest with contrast on 04/17/2021  The scan showed right axillary and subclavian vein thrombosis  She stated that she is doing relatively well on the Coumadin with therapeutic INR  However, she complain about heavy menstrual bleeding which was recently evaluated by her GYN team   She currently has progestin IUD in place  Her most recent blood work from 05/29/2021 while on Coumadin showed hemoglobin of 11 5 with MCV of 80  Platelet count 400  WBC of 5 0 with normal white cell differential  CMP entirely normal     protein C activity and antigen level were below normal and so was the protein S activity and antigen levels  The lupus anticoagulant test came back negative this time  Anticardiolipin antibody titers were within normal range  D-dimer was normal 0 28  SADI and rheumatoid factor were within normal range  Inflammation markers within normal range  The patient denied obvious bleeding from any sites  She continues to be active running on regular basis  ROS: Review of Systems   Constitutional: Positive for fatigue  Negative for chills and fever  HENT: Positive for hearing loss  Negative for ear pain and sore throat  Eyes: Negative for pain and visual disturbance  Respiratory: Negative for cough and shortness of breath  Cardiovascular: Negative for chest pain and palpitations  Gastrointestinal: Negative for abdominal pain and vomiting  Genitourinary: Positive for menstrual problem  Negative for dysuria and hematuria  Musculoskeletal: Negative for arthralgias and back pain  Skin: Negative for color change and rash  Neurological: Negative for seizures and syncope  All other systems reviewed and are negative  Past Medical History:   Diagnosis Date    DVT (deep venous thrombosis) ([facility] ) 02/08/2021    rigght arm       History reviewed   No pertinent surgical history      Social History     Socioeconomic History    Marital status: Single     Spouse name: None    Number of children: None    Years of education: None    Highest education level: None   Occupational History    None   Social Needs    Financial resource strain: None    Food insecurity     Worry: None     Inability: None    Transportation needs     Medical: None     Non-medical: None   Tobacco Use    Smoking status: Never Smoker    Smokeless tobacco: Never Used   Substance and Sexual Activity    Alcohol use: Yes     Frequency: Monthly or less     Drinks per session: 1 or 2     Binge frequency: Never    Drug use: No    Sexual activity: Yes     Partners: Male     Birth control/protection: Condom Male   Lifestyle    Physical activity     Days per week: None     Minutes per session: None    Stress: None   Relationships    Social connections     Talks on phone: None     Gets together: None     Attends Protestant service: None     Active member of club or organization: None     Attends meetings of clubs or organizations: None     Relationship status: None    Intimate partner violence     Fear of current or ex partner: None     Emotionally abused: None     Physically abused: None     Forced sexual activity: None   Other Topics Concern    None   Social History Narrative    None       Family History   Problem Relation Age of Onset    Hypertension Mother     Benign prostatic hyperplasia Father     Stroke Maternal Grandmother     No Known Problems Sister     No Known Problems Brother     Endometriosis Sister        No Known Allergies      Current Outpatient Medications:     clindamycin (CLINDAGEL) 1 % gel, Apply topically 2 (two) times a day, Disp: 30 g, Rfl: 3    cyclobenzaprine (FLEXERIL) 10 mg tablet, Take 1 tablet (10 mg total) by mouth 3 (three) times a day as needed for muscle spasms, Disp: 60 tablet, Rfl: 0    levonorgestrel (Kyleena) 19 5 MG intrauterine device, 1 Intra Uterine Device by Intrauterine route once, Disp: , Rfl:     warfarin (COUMADIN) 1 mg tablet, BID, Disp: 60 tablet, Rfl: 5    warfarin (COUMADIN) 5 mg tablet, Take 1 tablet daily, Disp: 30 tablet, Rfl: 11    acetaminophen-codeine (TYLENOL #2) 300-15 MG per tablet, Take 1 tablet by mouth every 4 (four) hours as needed for moderate pain (Patient not taking: Reported on 4/22/2021), Disp: 30 tablet, Rfl: 0      Physical Exam:  /90 (BP Location: Left arm, Patient Position: Sitting, Cuff Size: Adult)   Pulse (!) 51   Temp (!) 97 2 °F (36 2 °C) (Tympanic)   Resp 18   Ht 5' 4" (1 626 m)   Wt 60 8 kg (134 lb)   LMP 05/23/2021   SpO2 97%   BMI 23 00 kg/m²     Physical Exam  Constitutional:       General: She is not in acute distress  Appearance: She is well-developed  She is not diaphoretic  HENT:      Head: Normocephalic and atraumatic  Eyes:      General: No scleral icterus  Right eye: No discharge  Left eye: No discharge  Conjunctiva/sclera: Conjunctivae normal       Pupils: Pupils are equal, round, and reactive to light  Neck:      Musculoskeletal: Normal range of motion and neck supple  Thyroid: No thyromegaly  Vascular: No JVD  Trachea: No tracheal deviation  Cardiovascular:      Rate and Rhythm: Normal rate and regular rhythm  Heart sounds: Normal heart sounds  No murmur  No friction rub  Pulmonary:      Effort: Pulmonary effort is normal  No respiratory distress  Breath sounds: Normal breath sounds  No stridor  No wheezing or rales  Chest:      Chest wall: No tenderness  Abdominal:      General: There is no distension  Palpations: Abdomen is soft  There is no hepatomegaly or splenomegaly  Tenderness: There is no abdominal tenderness  There is no guarding or rebound  Musculoskeletal: Normal range of motion  General: No tenderness or deformity  Lymphadenopathy:      Cervical: No cervical adenopathy     Skin:     General: Skin is warm and dry  Coloration: Skin is not pale  Findings: No erythema or rash  Neurological:      Mental Status: She is alert and oriented to person, place, and time  Cranial Nerves: No cranial nerve deficit  Coordination: Coordination normal       Deep Tendon Reflexes: Reflexes are normal and symmetric  Psychiatric:         Behavior: Behavior normal          Thought Content: Thought content normal          Judgment: Judgment normal            Labs:  Lab Results   Component Value Date    WBC 5 00 05/29/2021    HGB 11 5 (L) 05/29/2021    HCT 35 4 (L) 05/29/2021    MCV 80 05/29/2021     05/29/2021     Lab Results   Component Value Date    K 4 0 05/29/2021     05/29/2021    CO2 23 05/29/2021    BUN 12 05/29/2021    CREATININE 0 69 05/29/2021    GLUF 86 05/29/2021    CALCIUM 9 1 05/29/2021    AST 25 05/29/2021    ALT 14 05/29/2021    ALKPHOS 76 05/29/2021    EGFR 136 05/29/2021       Patient voiced understanding and agreement in the above discussion  Aware to contact our office with questions/symptoms in the interim

## 2021-07-31 ENCOUNTER — APPOINTMENT (OUTPATIENT)
Dept: LAB | Facility: HOSPITAL | Age: 31
End: 2021-07-31
Attending: FAMILY MEDICINE

## 2021-08-03 ENCOUNTER — ANTICOAG VISIT (OUTPATIENT)
Dept: FAMILY MEDICINE CLINIC | Facility: CLINIC | Age: 31
End: 2021-08-03

## 2021-08-03 DIAGNOSIS — I82.A11 ACUTE DEEP VEIN THROMBOSIS (DVT) OF AXILLARY VEIN OF RIGHT UPPER EXTREMITY (HCC): Primary | ICD-10-CM

## 2021-09-24 ENCOUNTER — TELEPHONE (OUTPATIENT)
Dept: HEMATOLOGY ONCOLOGY | Facility: CLINIC | Age: 31
End: 2021-09-24

## 2021-09-24 NOTE — TELEPHONE ENCOUNTER
Appointment Cancellation Or Reschedule     Person calling in Patient    Provider Dr Keyona Ellison   Office Visit Date and Time  11/05   Office Visit Location [place]   Did patient want to reschedule their office appointment? If so, when was it scheduled to? Yes 11/08   Reason for Cancellation or Reschedule Work conflict r/s appt      If the patient is a treatment patient, please route this to the office nurse  If the patient is not on treatment, please route to the office MA

## 2021-10-22 PROBLEM — Z12.4 SCREENING FOR CERVICAL CANCER: Status: ACTIVE | Noted: 2021-10-22

## 2021-10-22 PROBLEM — Z01.419 ENCOUNTER FOR ANNUAL ROUTINE GYNECOLOGICAL EXAMINATION: Status: ACTIVE | Noted: 2021-10-22

## 2021-10-22 PROBLEM — Z97.5 IUD (INTRAUTERINE DEVICE) IN PLACE: Status: ACTIVE | Noted: 2021-10-22

## 2021-10-25 ENCOUNTER — ANNUAL EXAM (OUTPATIENT)
Dept: OBGYN CLINIC | Facility: CLINIC | Age: 31
End: 2021-10-25

## 2021-10-25 VITALS
BODY MASS INDEX: 24.1 KG/M2 | DIASTOLIC BLOOD PRESSURE: 60 MMHG | WEIGHT: 136 LBS | HEIGHT: 63 IN | SYSTOLIC BLOOD PRESSURE: 122 MMHG

## 2021-10-25 DIAGNOSIS — Z97.5 IUD (INTRAUTERINE DEVICE) IN PLACE: ICD-10-CM

## 2021-10-25 DIAGNOSIS — Z01.419 ENCOUNTER FOR ANNUAL ROUTINE GYNECOLOGICAL EXAMINATION: Primary | ICD-10-CM

## 2021-10-25 DIAGNOSIS — Z12.4 SCREENING FOR CERVICAL CANCER: ICD-10-CM

## 2021-10-25 PROCEDURE — G0476 HPV COMBO ASSAY CA SCREEN: HCPCS | Performed by: OBSTETRICS & GYNECOLOGY

## 2021-10-25 PROCEDURE — 99395 PREV VISIT EST AGE 18-39: CPT | Performed by: OBSTETRICS & GYNECOLOGY

## 2021-10-25 PROCEDURE — G0145 SCR C/V CYTO,THINLAYER,RESCR: HCPCS | Performed by: OBSTETRICS & GYNECOLOGY

## 2021-10-27 LAB
HPV HR 12 DNA CVX QL NAA+PROBE: NEGATIVE
HPV16 DNA CVX QL NAA+PROBE: NEGATIVE
HPV18 DNA CVX QL NAA+PROBE: NEGATIVE

## 2021-11-01 LAB
LAB AP GYN PRIMARY INTERPRETATION: NORMAL
Lab: NORMAL

## 2022-01-10 ENCOUNTER — OFFICE VISIT (OUTPATIENT)
Dept: HEMATOLOGY ONCOLOGY | Facility: CLINIC | Age: 32
End: 2022-01-10
Payer: COMMERCIAL

## 2022-01-10 VITALS
BODY MASS INDEX: 22.36 KG/M2 | RESPIRATION RATE: 18 BRPM | OXYGEN SATURATION: 95 % | WEIGHT: 131 LBS | HEIGHT: 64 IN | DIASTOLIC BLOOD PRESSURE: 64 MMHG | HEART RATE: 75 BPM | TEMPERATURE: 98.2 F | SYSTOLIC BLOOD PRESSURE: 118 MMHG

## 2022-01-10 DIAGNOSIS — I82.A11 ACUTE DEEP VEIN THROMBOSIS (DVT) OF AXILLARY VEIN OF RIGHT UPPER EXTREMITY (HCC): Primary | ICD-10-CM

## 2022-01-10 DIAGNOSIS — D50.9 IRON DEFICIENCY ANEMIA, UNSPECIFIED IRON DEFICIENCY ANEMIA TYPE: ICD-10-CM

## 2022-01-10 PROCEDURE — 99214 OFFICE O/P EST MOD 30 MIN: CPT | Performed by: INTERNAL MEDICINE

## 2022-01-10 PROCEDURE — 3008F BODY MASS INDEX DOCD: CPT | Performed by: INTERNAL MEDICINE

## 2022-01-10 PROCEDURE — 1036F TOBACCO NON-USER: CPT | Performed by: INTERNAL MEDICINE

## 2022-01-10 RX ORDER — FERROUS SULFATE 325(65) MG
325 TABLET ORAL
COMMUNITY

## 2022-01-10 NOTE — PROGRESS NOTES
DATE OF ADMISSION:  03/09/2020    DATE OF DISCHARGE:  03/16/2020    PRIMARY CARE PHYSICIAN:  Dr. Manny Mejia    DISCHARGE DIAGNOSES:    1. Peripheral arterial disease, status post right common femoral endarterectomy with pericardial patch angioplasty.  2. Unstable angina, status post left heart catheterization, unstable angina, status post left heart catheterization, PCI to the mid and distal LAD, pseudoaneurysm of distal right radial artery.  Acute blood-loss anemia with underlying chronic normocytic anemia.  3. Coronary artery disease.  4. CHF with diastolic dysfunction.  5. Essential hypertension.  6. Hyperlipidemia.  7. Acute kidney injury secondary to prerenal azotemia, resolved.    HOSPITAL COURSE:  The patient is a very pleasant 89-year-old  male.  He initially presented here to [facility] for elective procedure with Dr. Khan.  He has a history of high-grade stenosis in the right common femoral artery and subsequently underwent right common femoral endarterectomy with pericardial patch angioplasty.  Unfortunately, his postoperative course was complicated by development of unstable angina for which he  underwent left heart catheterization by Dr. Braxton and found to have 70%-80% mid to distal LAD stenosis requiring drug-eluting stent placement.  He was also noted to have 50% to 60%. stenosis of the proximal RCA with 100% distal occlusion, although large VANDANA fills with left-to-right collaterals.  Postprocedurally, unfortunately, again, the patient's course was complicated by a distal radial artery pseudoaneurysm on the insertion site on the  right arm.  This was treated conservatively with TR banding.  Did not require thrombin injection or surgery.  He did have significant blood loss anemia which required transfusion, but subsequently hemoglobin stabilized.  He was cleared by Cardiology and CV Surgery for discharge to home.  He will follow up outpatient.  Scripts were provided and sent  Hematology/Oncology Outpatient Follow-up  Nikki Phillips 28 y o  female 1990 [id number]    Date:  1/10/2022    Assessment and Plan:  1  Acute deep vein thrombosis (DVT) of axillary vein of right upper extremity (HCC)  The patient is currently off of any anticoagulation  She took the Coumadin for about 8 months  We did discuss the recent workup after she was off of anticoagulation for at least 4-5 weeks  The workup showed significantly low protein S total antigen level of 12% which is very provoking result of for the diagnosis of protein S deficiency  I did educate the patient about the diagnosis of protein S deficiency and the usual autosomal dominant inheritance pattern  She stated that she does not have any obvious family history of hypercoagulability  The patient was told that the diagnosis of protein S deficiency  is very difficult diagnosis to make  We will recheck her protein S free antigen level prior to her next visit which is usually more accurate  She also was told that the total protein S antigen could be low due to vitamin K deficiency  I suggested that she takes multivitamins with vitamin K to avoid the falls interpretation of the protein S deficiency  The patient is currently off of anticoagulation and she was encouraged to continue to be off for the time being     - Protein S Antigen, Total & Free; Future  - Protein S activity; Future  - D-dimer, quantitative; Future    2  Iron deficiency anemia, unspecified iron deficiency anemia type  She is taking oral iron supplements on every other day basis  However, she continues to be iron deficient with ferritin of 12 and borderline low hemoglobin level  The patient was offered iron IV treatment  She will think about it and get back to us at the next visit  - CBC and differential; Future  - Comprehensive metabolic panel;  Future      HPI:  This is a very pleasant 44-year-old female 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  Pricilla Boles does not seem to have any obvious past medical history or family history for hypercoagulable disorder  Michaela Quezada was never pregnant and denied any miscarriages  The patient stated that she had swelling and pain of the medial aspect of her right elbow and right arm around the 8th of February 2021   She was then evaluated with a Doppler ultrasound on 02/08/2021 which showed evidence of acute occlusive, deep vein thrombosis in the distal axillary and proximal brachial veins   There was also evidence of acute superficial thrombophlebitis in the cephalic vein at the distal upper arm and proximal and mid forearm and in the basilic vein at the mid and distal upper arm and in the median cubital vein    The left upper extremity was negative for any clotting  The patient  Was then evaluated with thrombosis panel before she was started on anticoagulation with Eliquis 10 mg twice a day for a week then Eliquis 5 mg twice a day  Which she continued to take on a daily basis without any complications or bleeding  She did notice heavy menstrual bleeding after the start of Eliquis  The thrombosis panel came back positive for lupus anticoagulant   The beta 2 glycoprotein antibodies were within normal range   The anti cardiolipin IgM level was 14 which is in the indeterminate range   The protein  S activity level was 10% the protein S antigen level was 39% and protein S antigen free level was 28%   Protein C activity level was 97%  Interval history:  The patient came today for follow-up visit  She stated that she stop the Coumadin about 4-5 weeks before she did her recent blood work on 12/23/2021  The blood work this time showed low functional protein S level of 12%  The protein C functional level was normal   The protein S total antigen level was 62 which is borderline low    The cardia until open IgM level was borderline higher than average at 14 5 otherwise the antiphospholipid antibody workup came back normal   Her to his pharmacy.    DISCHARGE PHYSICAL EXAMINATION:  CURRENT VITAL SIGNS:  Blood pressure is 128/50, heart rate 64, respiratory rate 16, pulse ox is 99% on room air, temperature is 97.5 degrees Fahrenheit.  GENERAL:  The patient is a well-developed, well-nourished male, in no distress.  HEENT:  Normal.   Neurological:  Alert and oriented x3.  Right wrist region was evaluated.  No active bleeding from the site.  He has 5/5 right arm strength.  Radial pulses could be palpated distally.    LABORATORY DATA:  On day of discharge, sodium 138, potassium 3.8, chloride 109, bicarb 20, BUN 29, creatinine 0.08, calcium 7.6.  WBC 5.1, hemoglobin 8.0, hematocrit 24, platelets are 214.    IMAGING DATA:    1. Duplex scan done on 03/12/2020 shows a pseudoaneurysm arising from the distal radial artery.  No large vessel occlusion.  2. Duplex done on 03/13/2020 shows interval decompression of distal radial artery pseudoaneurysm.  3. _________ decompressed with no flow.  However, the neck appears patent.    ECHOCARDIOGRAM RESULTS:  During the hospitalization, done on 03/11/2020, shows normal LV size and systolic function with EF of 65% to 70%.  Diastolic function mildly abnormal.  Elevated filling pressures.  Calcified mild aortic stenosis.  Mild pulmonary hypertension.  There is a small area of apical hypokinesis.    PROCEDURES DURING THE HOSPITALIZATION:    1. Right common femoral endarterectomy with pericardial patch angioplasty.  2. Left heart catheterization requiring PCI and drug-eluting stent placement to the distal LAD.    PENDING TEST RESULTS ON DAY OF DISCHARGE:  None.    DISCHARGE MEDICATIONS:    1. Aspirin 81 mg p.o. daily.  2. Combigan ophthalmic 1 drop each eye b.i.d.  3. Plavix 25 mg p.o. daily.  4. Ferrex 150 mg p.o. daily p.r.n.  5. Lantus ophthalmic 1 drop each eye q.i.d.  6. Lisinopril 40 mg p.o. daily.  7. Metoprolol 25 mg p.o. b.i.d.  8. Crestor 20 mg p.o. daily.    DISCHARGE FOLLOWUP PLAN:    1. PCP in 1-2  creatinine was 0 8 with normal calcium and liver enzymes  Her ferritin was 12 with hemoglobin of 11 8 and normal white cells and platelets  ROS: Review of Systems   Constitutional: Positive for fatigue  Negative for chills and fever  HENT: Negative for ear pain and sore throat  Eyes: Negative for pain and visual disturbance  Respiratory: Negative for cough and shortness of breath  Cardiovascular: Negative for chest pain and palpitations  Gastrointestinal: Negative for abdominal pain and vomiting  Genitourinary: Negative for dysuria and hematuria  Musculoskeletal: Negative for arthralgias and back pain  Skin: Negative for color change and rash  Neurological: Positive for numbness  Negative for seizures and syncope  Psychiatric/Behavioral: Positive for sleep disturbance  All other systems reviewed and are negative  Past Medical History:   Diagnosis Date    [facility]    rigght arm       History reviewed  No pertinent surgical history  Social History     Socioeconomic History    Marital status: Single     Spouse name: None    Number of children: None    Years of education: None    Highest education level: None   Occupational History    None   Tobacco Use    Smoking status: Never Smoker    Smokeless tobacco: Never Used   Vaping Use    Vaping Use: Never used   Substance and Sexual Activity    Alcohol use: Yes    Drug use: No    Sexual activity: Yes     Partners: Male     Birth control/protection: Condom Male, I U D     Other Topics Concern    None   Social History Narrative    None     Social Determinants of Health     Financial Resource Strain: Not on file   Food Insecurity: Not on file   Transportation Needs: Not on file   Physical Activity: Not on file   Stress: Not on file   Social Connections: Not on file   Intimate Partner Violence: Not on file   Housing Stability: Not on file       Family History   Problem Relation Age of Onset weeks.  2.  in 1-2 weeks.  3. Dr. Braxton 1-2 weeks.  The patient did go home with an event monitor as well per Cardiology.    DISPOSITION:  The patient was discharged home.     Time spent on discharge process is greater than 35 minutes and medication.        ______________________________  Osmar Felder DO  [id number]      D:  03/16/2020 13:44:07  T:  03/16/2020 14:37:56   GALLITO/modl   Job:  [id number]    Hypertension Mother     Benign prostatic hyperplasia Father     Stroke Maternal Grandmother     No Known Problems Sister     No Known Problems Brother     Endometriosis Sister        No Known Allergies      Current Outpatient Medications:     clindamycin (CLINDAGEL) 1 % gel, Apply topically 2 (two) times a day, Disp: 30 g, Rfl: 3    ferrous sulfate 325 (65 Fe) mg tablet, Take 325 mg by mouth daily with breakfast Every other day, Disp: , Rfl:     levonorgestrel (Kyleena) 19 5 MG intrauterine device, 1 Intra Uterine Device by Intrauterine route once, Disp: , Rfl:     acetaminophen-codeine (TYLENOL #2) 300-15 MG per tablet, Take 1 tablet by mouth every 4 (four) hours as needed for moderate pain (Patient not taking: Reported on 4/22/2021), Disp: 30 tablet, Rfl: 0    cyclobenzaprine (FLEXERIL) 10 mg tablet, Take 1 tablet (10 mg total) by mouth 3 (three) times a day as needed for muscle spasms (Patient not taking: Reported on 1/10/2022 ), Disp: 60 tablet, Rfl: 0    warfarin (COUMADIN) 1 mg tablet, BID (Patient not taking: Reported on 1/10/2022 ), Disp: 60 tablet, Rfl: 5    warfarin (COUMADIN) 5 mg tablet, Take 1 tablet daily (Patient not taking: Reported on 1/10/2022 ), Disp: 30 tablet, Rfl: 11      Physical Exam:  /64 (BP Location: Left arm, Patient Position: Sitting, Cuff Size: Adult)   Pulse 75   Temp 98 2 °F (36 8 °C) (Temporal)   Resp 18   Ht 5' 3 9" (1 623 m)   Wt 59 4 kg (131 lb)   SpO2 95%   BMI 22 56 kg/m²     Physical Exam  Constitutional:       General: She is not in acute distress  Appearance: She is well-developed  She is not diaphoretic  HENT:      Head: Normocephalic and atraumatic  Eyes:      General: No scleral icterus  Right eye: No discharge  Left eye: No discharge  Conjunctiva/sclera: Conjunctivae normal       Pupils: Pupils are equal, round, and reactive to light  Neck:      Thyroid: No thyromegaly  Vascular: No JVD        Trachea: No tracheal deviation  Cardiovascular:      Rate and Rhythm: Normal rate and regular rhythm  Heart sounds: Normal heart sounds  No murmur heard  No friction rub  Pulmonary:      Effort: Pulmonary effort is normal  No respiratory distress  Breath sounds: Normal breath sounds  No stridor  No wheezing or rales  Chest:      Chest wall: No tenderness  Abdominal:      General: There is no distension  Palpations: Abdomen is soft  There is no hepatomegaly or splenomegaly  Tenderness: There is no abdominal tenderness  There is no guarding or rebound  Musculoskeletal:         General: No tenderness or deformity  Normal range of motion  Cervical back: Normal range of motion and neck supple  Lymphadenopathy:      Cervical: No cervical adenopathy  Skin:     General: Skin is warm and dry  Coloration: Skin is not pale  Findings: No erythema or rash  Neurological:      Mental Status: She is alert and oriented to person, place, and time  Cranial Nerves: No cranial nerve deficit  Coordination: Coordination normal       Deep Tendon Reflexes: Reflexes are normal and symmetric  Psychiatric:         Behavior: Behavior normal          Thought Content: Thought content normal          Judgment: Judgment normal            Labs:  Lab Results   Component Value Date    WBC 5 00 05/29/2021    HGB 11 5 (L) 05/29/2021    HCT 35 4 (L) 05/29/2021    MCV 80 05/29/2021     05/29/2021     Lab Results   Component Value Date    K 4 0 05/29/2021     05/29/2021    CO2 23 05/29/2021    BUN 12 05/29/2021    CREATININE 0 69 05/29/2021    GLUF 86 05/29/2021    CALCIUM 9 1 05/29/2021    AST 25 05/29/2021    ALT 14 05/29/2021    ALKPHOS 76 05/29/2021    EGFR 136 05/29/2021       Patient voiced understanding and agreement in the above discussion  Aware to contact our office with questions/symptoms in the interim

## 2022-03-07 ENCOUNTER — TELEPHONE (OUTPATIENT)
Dept: HEMATOLOGY ONCOLOGY | Facility: CLINIC | Age: 32
End: 2022-03-07

## 2022-03-07 NOTE — TELEPHONE ENCOUNTER
I spoke with the patient in regards to her lab work that needs to be completed prior to her appointment on 3/14/22 at 8:40am  Patient states understanding

## 2022-03-14 ENCOUNTER — OFFICE VISIT (OUTPATIENT)
Dept: HEMATOLOGY ONCOLOGY | Facility: CLINIC | Age: 32
End: 2022-03-14
Payer: COMMERCIAL

## 2022-03-14 VITALS
HEIGHT: 64 IN | OXYGEN SATURATION: 100 % | BODY MASS INDEX: 22.98 KG/M2 | HEART RATE: 77 BPM | DIASTOLIC BLOOD PRESSURE: 78 MMHG | RESPIRATION RATE: 18 BRPM | TEMPERATURE: 96.8 F | WEIGHT: 134.6 LBS | SYSTOLIC BLOOD PRESSURE: 128 MMHG

## 2022-03-14 DIAGNOSIS — D50.9 IRON DEFICIENCY ANEMIA, UNSPECIFIED IRON DEFICIENCY ANEMIA TYPE: Primary | ICD-10-CM

## 2022-03-14 DIAGNOSIS — I82.A11 ACUTE DEEP VEIN THROMBOSIS (DVT) OF AXILLARY VEIN OF RIGHT UPPER EXTREMITY (HCC): ICD-10-CM

## 2022-03-14 PROCEDURE — 99214 OFFICE O/P EST MOD 30 MIN: CPT | Performed by: INTERNAL MEDICINE

## 2022-03-14 PROCEDURE — 3008F BODY MASS INDEX DOCD: CPT | Performed by: INTERNAL MEDICINE

## 2022-03-14 PROCEDURE — 1036F TOBACCO NON-USER: CPT | Performed by: INTERNAL MEDICINE

## 2022-03-14 NOTE — PROGRESS NOTES
Hematology/Oncology Outpatient Follow-up  Ade Hi 28 y o  female 1990 [id number]    Date:  3/14/2022    Assessment and Plan:  1  Iron deficiency anemia, unspecified iron deficiency anemia type  The patient does not seem to be anemic at this point in time  We will check her iron panel prior to her next visit  - CBC and differential; Future  - Comprehensive metabolic panel; Future  - Iron Panel (Includes Ferritin, Iron Sat%, Iron, and TIBC); Future  - Ferritin; Future  - LD,Blood; Future  - C-reactive protein; Future  - Sedimentation rate, automated; Future    2  Acute deep vein thrombosis (DVT) of axillary vein of right upper extremity (HCC)  I had a lengthy discussion with the patient regarding her protein S activity and antigen levels including the total and free antigen levels which came all lower than average which makes the diagnosis of protein S deficiency very likely given the history of unprovoked right upper extremity DVT  The patient was told that her D-dimer was also borderline higher than normal which may be an indication of increased hypercoagulability  I discussed with the patient the 2 options of either continued observation without anticoagulation versus indefinite anticoagulation  The patient was told that if develops another clotting event then indefinite anticoagulation definitely would be strongly recommended  At the end of the visit, the decision was made to repeat the protein S free antigen level prior to her next visit in about 4-6 months from now  We will continue also with close monitoring without anticoagulation for the time being unless she develops another clotting event  - Protein S activity; Future  - Protein S Antigen, Total & Free; Future  - D-dimer, quantitative;  Future      HPI:    This is a very pleasant 69-year-old female 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  Edyta Yee does not seem to have any obvious past medical history or family history for hypercoagulable disorder  Genna Whaley was never pregnant and denied any miscarriages  The patient stated that she had swelling and pain of the medial aspect of her right elbow and right arm around the 8th of February 2021   She was then evaluated with a Doppler ultrasound on 02/08/2021 which showed evidence of acute occlusive, deep vein thrombosis in the distal axillary and proximal brachial veins   There was also evidence of acute superficial thrombophlebitis in the cephalic vein at the distal upper arm and proximal and mid forearm and in the basilic vein at the mid and distal upper arm and in the median cubital vein    The left upper extremity was negative for any clotting  The patient  Was then evaluated with thrombosis panel before she was started on anticoagulation with Eliquis 10 mg twice a day for a week then Eliquis 5 mg twice a day  Which she continued to take on a daily basis without any complications or bleeding  She did notice heavy menstrual bleeding after the start of Eliquis  The thrombosis panel came back positive for lupus anticoagulant   The beta 2 glycoprotein antibodies were within normal range   The anti cardiolipin IgM level was 14 which is in the indeterminate range   The protein  S activity level was 10% the protein S antigen level was 39% and protein S antigen free level was 28%   Protein C activity level was 97%  Interval history:  The patient came today for follow-up visit  She is currently off of any type of anticoagulation or birth control  She had blood work on 03/02/2022 which showed mildly elevated D-dimer of 0 55  Creatinine was 0 85 with normal calcium liver enzymes  Her hemoglobin was 12 2 with normal white cells and normal platelets  The protein S activity level was 10%  The free antigen level was 23% and total protein S antigen level was 64%  ROS: Review of Systems   Constitutional: Positive for fatigue  Negative for chills and fever     HENT: Negative for ear pain and sore throat  Eyes: Negative for pain and visual disturbance  Respiratory: Positive for cough  Negative for shortness of breath  Cardiovascular: Negative for chest pain and palpitations  Gastrointestinal: Negative for abdominal pain and vomiting  Genitourinary: Negative for dysuria and hematuria  Musculoskeletal: Negative for arthralgias and back pain  Skin: Negative for color change and rash  Neurological: Negative for seizures and syncope  All other systems reviewed and are negative  Past Medical History:   Diagnosis Date    [facility]    rigght arm       No past surgical history on file  Social History     Socioeconomic History    Marital status: Single     Spouse name: None    Number of children: None    Years of education: None    Highest education level: None   Occupational History    None   Tobacco Use    Smoking status: Never Smoker    Smokeless tobacco: Never Used   Vaping Use    Vaping Use: Never used   Substance and Sexual Activity    Alcohol use: Yes    Drug use: No    Sexual activity: Yes     Partners: Male     Birth control/protection: Condom Male, I U D     Other Topics Concern    None   Social History Narrative    None     Social Determinants of Health     Financial Resource Strain: Not on file   Food Insecurity: Not on file   Transportation Needs: Not on file   Physical Activity: Not on file   Stress: Not on file   Social Connections: Not on file   Intimate Partner Violence: Not on file   Housing Stability: Not on file       Family History   Problem Relation Age of Onset    Hypertension Mother     Benign prostatic hyperplasia Father     Stroke Maternal Grandmother     No Known Problems Sister     No Known Problems Brother     Endometriosis Sister        No Known Allergies      Current Outpatient Medications:     ferrous sulfate 325 (65 Fe) mg tablet, Take 325 mg by mouth daily with breakfast Every other day, Disp: , Rfl:     levonorgestrel (Kyleena) 19 5 MG intrauterine device, 1 Intra Uterine Device by Intrauterine route once, Disp: , Rfl:     acetaminophen-codeine (TYLENOL #2) 300-15 MG per tablet, Take 1 tablet by mouth every 4 (four) hours as needed for moderate pain (Patient not taking: Reported on 4/22/2021), Disp: 30 tablet, Rfl: 0    clindamycin (CLINDAGEL) 1 % gel, Apply topically 2 (two) times a day (Patient not taking: Reported on 3/14/2022 ), Disp: 30 g, Rfl: 3    cyclobenzaprine (FLEXERIL) 10 mg tablet, Take 1 tablet (10 mg total) by mouth 3 (three) times a day as needed for muscle spasms (Patient not taking: Reported on 1/10/2022 ), Disp: 60 tablet, Rfl: 0    warfarin (COUMADIN) 1 mg tablet, BID (Patient not taking: Reported on 1/10/2022 ), Disp: 60 tablet, Rfl: 5    warfarin (COUMADIN) 5 mg tablet, Take 1 tablet daily (Patient not taking: Reported on 1/10/2022 ), Disp: 30 tablet, Rfl: 11      Physical Exam:  /78 (BP Location: Left arm, Patient Position: Sitting, Cuff Size: Adult)   Pulse 77   Temp (!) 96 8 °F (36 °C) (Tympanic)   Resp 18   Ht 5' 4" (1 626 m)   Wt 61 1 kg (134 lb 9 6 oz)   SpO2 100%   BMI 23 10 kg/m²     Physical Exam  Constitutional:       General: She is not in acute distress  Appearance: She is well-developed  She is not diaphoretic  HENT:      Head: Normocephalic and atraumatic  Eyes:      General: No scleral icterus  Right eye: No discharge  Left eye: No discharge  Conjunctiva/sclera: Conjunctivae normal       Pupils: Pupils are equal, round, and reactive to light  Neck:      Thyroid: No thyromegaly  Vascular: No JVD  Trachea: No tracheal deviation  Cardiovascular:      Rate and Rhythm: Normal rate and regular rhythm  Heart sounds: Normal heart sounds  No murmur heard  No friction rub  Pulmonary:      Effort: Pulmonary effort is normal  No respiratory distress  Breath sounds: Normal breath sounds  No stridor   No wheezing or rales  Chest:      Chest wall: No tenderness  Abdominal:      General: There is no distension  Palpations: Abdomen is soft  There is no hepatomegaly or splenomegaly  Tenderness: There is no abdominal tenderness  There is no guarding or rebound  Musculoskeletal:         General: No tenderness or deformity  Normal range of motion  Cervical back: Normal range of motion and neck supple  Lymphadenopathy:      Cervical: No cervical adenopathy  Skin:     General: Skin is warm and dry  Coloration: Skin is not pale  Findings: No erythema or rash  Neurological:      Mental Status: She is alert and oriented to person, place, and time  Cranial Nerves: No cranial nerve deficit  Coordination: Coordination normal       Deep Tendon Reflexes: Reflexes are normal and symmetric  Psychiatric:         Behavior: Behavior normal          Thought Content: Thought content normal          Judgment: Judgment normal            Labs:  Lab Results   Component Value Date    WBC 5 00 05/29/2021    HGB 11 5 (L) 05/29/2021    HCT 35 4 (L) 05/29/2021    MCV 80 05/29/2021     05/29/2021     Lab Results   Component Value Date    K 4 0 05/29/2021     05/29/2021    CO2 23 05/29/2021    BUN 12 05/29/2021    CREATININE 0 69 05/29/2021    GLUF 86 05/29/2021    CALCIUM 9 1 05/29/2021    AST 25 05/29/2021    ALT 14 05/29/2021    ALKPHOS 76 05/29/2021    EGFR 136 05/29/2021       Patient voiced understanding and agreement in the above discussion  Aware to contact our office with questions/symptoms in the interim

## 2022-09-07 ENCOUNTER — TELEPHONE (OUTPATIENT)
Dept: HEMATOLOGY ONCOLOGY | Facility: CLINIC | Age: 32
End: 2022-09-07

## 2022-09-07 NOTE — TELEPHONE ENCOUNTER
LVM to the patient in regards to her lab work that needs to be completed prior to her appt on 9/12/22 at 8:40am

## 2022-09-12 ENCOUNTER — OFFICE VISIT (OUTPATIENT)
Dept: HEMATOLOGY ONCOLOGY | Facility: CLINIC | Age: 32
End: 2022-09-12
Payer: COMMERCIAL

## 2022-09-12 ENCOUNTER — TELEPHONE (OUTPATIENT)
Dept: HEMATOLOGY ONCOLOGY | Facility: CLINIC | Age: 32
End: 2022-09-12

## 2022-09-12 VITALS
SYSTOLIC BLOOD PRESSURE: 119 MMHG | OXYGEN SATURATION: 99 % | BODY MASS INDEX: 22.47 KG/M2 | DIASTOLIC BLOOD PRESSURE: 74 MMHG | RESPIRATION RATE: 18 BRPM | HEART RATE: 80 BPM | WEIGHT: 131.6 LBS | TEMPERATURE: 97.9 F | HEIGHT: 64 IN

## 2022-09-12 DIAGNOSIS — I82.A11 ACUTE DEEP VEIN THROMBOSIS (DVT) OF AXILLARY VEIN OF RIGHT UPPER EXTREMITY (HCC): ICD-10-CM

## 2022-09-12 DIAGNOSIS — D50.9 IRON DEFICIENCY ANEMIA, UNSPECIFIED IRON DEFICIENCY ANEMIA TYPE: Primary | ICD-10-CM

## 2022-09-12 PROCEDURE — 99214 OFFICE O/P EST MOD 30 MIN: CPT | Performed by: INTERNAL MEDICINE

## 2022-09-12 RX ORDER — SODIUM CHLORIDE 9 MG/ML
20 INJECTION, SOLUTION INTRAVENOUS ONCE
Status: CANCELLED | OUTPATIENT
Start: 2022-09-22

## 2022-09-12 NOTE — PROGRESS NOTES
Hematology/Oncology Outpatient Follow-up  Christian Abernathy 28 y o  female 1990 [id number]    Date:  9/12/2022    Assessment and Plan:  1  Iron deficiency anemia, unspecified iron deficiency anemia type  The patient seems to have borderline low iron stores with ferritin of 22  She does not seem to be anemic at this point  She was offered oral iron supplements versus iron IV Venofer  She stated that she would rather take the iron IV  We will aim for 2 doses of Venofer to correct her iron stores  - CBC and differential; Future  - Comprehensive metabolic panel; Future  - Magnesium; Future  - Iron Panel (Includes Ferritin, Iron Sat%, Iron, and TIBC); Future  - Ferritin; Future    2  Acute deep vein thrombosis (DVT) of axillary vein of right upper extremity (HCC)  She seems to have low protein S activity and antigen levels including the total and free antigen  The patient is currently off of anticoagulation without any obvious hint of acute clotting event  We did discuss the usual recommendation for protein S deficiency  The patient was told that if she develops another clotting event then indefinite anticoagulation would be recommended unless there is absolute contraindication  We did also briefly discuss the need to avoid  estrogen based oral contraceptives  - Protein S Antigen, Total & Free; Future  - Protein S activity; Future  - D-dimer, quantitative; Future      HPI:  This is a very pleasant 28year-old female 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  Naresh Glass does not seem to have any obvious past medical history or family history for hypercoagulable disorder  Kenzie Choi was never pregnant and denied any miscarriages    The patient stated that she had swelling and pain of the medial aspect of her right elbow and right arm around the 8th of February 2021   She was then evaluated with a Doppler ultrasound on 02/08/2021 which showed evidence of acute occlusive, deep vein thrombosis in the distal axillary and proximal brachial veins   There was also evidence of acute superficial thrombophlebitis in the cephalic vein at the distal upper arm and proximal and mid forearm and in the basilic vein at the mid and distal upper arm and in the median cubital vein    The left upper extremity was negative for any clotting  The patient  Was then evaluated with thrombosis panel before she was started on anticoagulation with Eliquis 10 mg twice a day for a week then Eliquis 5 mg twice a day  Which she continued to take on a daily basis without any complications or bleeding  She did notice heavy menstrual bleeding after the start of Eliquis  The thrombosis panel came back positive for lupus anticoagulant   The beta 2 glycoprotein antibodies were within normal range   The anti cardiolipin IgM level was 14 which is in the indeterminate range   The protein  S activity level was 10% the protein S antigen level was 39% and protein S antigen free level was 28%   Protein C activity level was 97%  Interval history:  The patient came today for follow-up visit  She has been off of anticoagulation since October of last year  She denied any obvious clotting events  Recent blood work showed low protein S activity level of 14%  The free protein S antigen level in total level were also lower than average  D-dimer 0 5  Creatinine was 0 7 with normal liver enzymes  Hemoglobin was 13 3 with normal white cells and platelets  Ferritin was 22  She denied heavy menstrual bleeding  ROS: Review of Systems   Constitutional: Negative for chills and fever  HENT: Negative for ear pain and sore throat  Eyes: Negative for pain and visual disturbance  Respiratory: Negative for cough and shortness of breath  Cardiovascular: Negative for chest pain and palpitations  Gastrointestinal: Negative for abdominal pain and vomiting  Genitourinary: Negative for dysuria and hematuria     Musculoskeletal: Negative for arthralgias and back pain  Skin: Negative for color change and rash  Neurological: Negative for seizures and syncope  All other systems reviewed and are negative  Past Medical History:   Diagnosis Date    DVT (deep venous thrombosis) ([facility] [address] ) 02/08/2021    rigght arm       History reviewed  No pertinent surgical history  Social History     Socioeconomic History    Marital status: Single     Spouse name: None    Number of children: None    Years of education: None    Highest education level: None   Occupational History    None   Tobacco Use    Smoking status: Never Smoker    Smokeless tobacco: Never Used   Vaping Use    Vaping Use: Never used   Substance and Sexual Activity    Alcohol use: Yes    Drug use: No    Sexual activity: Yes     Partners: Male     Birth control/protection: Condom Male, I U D     Other Topics Concern    None   Social History Narrative    None     Social Determinants of Health     Financial Resource Strain: Not on file   Food Insecurity: Not on file   Transportation Needs: Not on file   Physical Activity: Not on file   Stress: Not on file   Social Connections: Not on file   Intimate Partner Violence: Not on file   Housing Stability: Not on file       Family History   Problem Relation Age of Onset    Hypertension Mother     Benign prostatic hyperplasia Father     Stroke Maternal Grandmother     No Known Problems Sister     No Known Problems Brother     Endometriosis Sister        No Known Allergies      Current Outpatient Medications:     ferrous sulfate 325 (65 Fe) mg tablet, Take 325 mg by mouth daily with breakfast Every other day, Disp: , Rfl:     levonorgestrel (Kyleena) 19 5 MG intrauterine device, 1 Intra Uterine Device by Intrauterine route once, Disp: , Rfl:     acetaminophen-codeine (TYLENOL #2) 300-15 MG per tablet, Take 1 tablet by mouth every 4 (four) hours as needed for moderate pain (Patient not taking: No sig reported), Disp: 30 tablet, Rfl: 0    clindamycin (CLINDAGEL) 1 % gel, Apply topically 2 (two) times a day (Patient not taking: No sig reported), Disp: 30 g, Rfl: 3    cyclobenzaprine (FLEXERIL) 10 mg tablet, Take 1 tablet (10 mg total) by mouth 3 (three) times a day as needed for muscle spasms (Patient not taking: No sig reported), Disp: 60 tablet, Rfl: 0    warfarin (COUMADIN) 1 mg tablet, BID (Patient not taking: No sig reported), Disp: 60 tablet, Rfl: 5    warfarin (COUMADIN) 5 mg tablet, Take 1 tablet daily (Patient not taking: No sig reported), Disp: 30 tablet, Rfl: 11      Physical Exam:  /74 (BP Location: Left arm, Patient Position: Sitting, Cuff Size: Adult)   Pulse 80   Temp 97 9 °F (36 6 °C) (Tympanic)   Resp 18   Ht 5' 4" (1 626 m)   Wt 59 7 kg (131 lb 9 6 oz)   SpO2 99%   BMI 22 59 kg/m²     Physical Exam  Constitutional:       General: She is not in acute distress  Appearance: She is well-developed  She is not diaphoretic  HENT:      Head: Normocephalic and atraumatic  Eyes:      General: No scleral icterus  Right eye: No discharge  Left eye: No discharge  Conjunctiva/sclera: Conjunctivae normal       Pupils: Pupils are equal, round, and reactive to light  Neck:      Thyroid: No thyromegaly  Vascular: No JVD  Trachea: No tracheal deviation  Cardiovascular:      Rate and Rhythm: Normal rate and regular rhythm  Heart sounds: Normal heart sounds  No murmur heard  No friction rub  Pulmonary:      Effort: Pulmonary effort is normal  No respiratory distress  Breath sounds: Normal breath sounds  No stridor  No wheezing or rales  Chest:      Chest wall: No tenderness  Abdominal:      General: There is no distension  Palpations: Abdomen is soft  There is no hepatomegaly or splenomegaly  Tenderness: There is no abdominal tenderness  There is no guarding or rebound  Musculoskeletal:         General: No tenderness or deformity  Normal range of motion        Cervical back: Normal range of motion and neck supple  Lymphadenopathy:      Cervical: No cervical adenopathy  Skin:     General: Skin is warm and dry  Coloration: Skin is not pale  Findings: No erythema or rash  Neurological:      Mental Status: She is alert and oriented to person, place, and time  Cranial Nerves: No cranial nerve deficit  Coordination: Coordination normal       Deep Tendon Reflexes: Reflexes are normal and symmetric  Psychiatric:         Behavior: Behavior normal          Thought Content: Thought content normal          Judgment: Judgment normal            Labs:  Lab Results   Component Value Date    WBC 5 00 05/29/2021    HGB 11 5 (L) 05/29/2021    HCT 35 4 (L) 05/29/2021    MCV 80 05/29/2021     05/29/2021     Lab Results   Component Value Date    K 4 0 05/29/2021     05/29/2021    CO2 23 05/29/2021    BUN 12 05/29/2021    CREATININE 0 69 05/29/2021    GLUF 86 05/29/2021    CALCIUM 9 1 05/29/2021    AST 25 05/29/2021    ALT 14 05/29/2021    ALKPHOS 76 05/29/2021    EGFR 136 05/29/2021       Patient voiced understanding and agreement in the above discussion  Aware to contact our office with questions/symptoms in the interim

## 2022-09-12 NOTE — TELEPHONE ENCOUNTER
Call went straight to voicemail  Patient was made aware of both iron infusions times and dates  Patient was also made aware schedule can seen on mychart  Patient was given my teams number to return my call

## 2022-09-12 NOTE — TELEPHONE ENCOUNTER
While we try to accommodate patient requests, our priority is to schedule treatment according to Doctor's orders and site availability  1  Does the Provider use the intake sheet or checkout note? 2  What would be a preferred day of the week that would work best for your infusion appointment? Thursday  3  Do you prefer mornings or afternoons for your appointments? Afternoon  4  Are there any days or dates that do not work for your schedule, including any upcoming vacations? No  5  We are going to try our best to schedule you at the infusion center closest to your home  In the event that we are unable to what would be your next preferred infusion site or sites? 1  [facility]  2  [facility]      6  Do you have transportation to take you to all of your appointments? Yes  7   Would you like the infusion center to draw labs from your port? (disregard if patient doesn't have a port or need labs for infusion appointment)

## 2022-09-22 ENCOUNTER — HOSPITAL ENCOUNTER (OUTPATIENT)
Dept: INFUSION CENTER | Facility: HOSPITAL | Age: 32
End: 2022-09-22
Attending: INTERNAL MEDICINE
Payer: COMMERCIAL

## 2022-09-22 VITALS
SYSTOLIC BLOOD PRESSURE: 136 MMHG | OXYGEN SATURATION: 100 % | HEART RATE: 85 BPM | TEMPERATURE: 97.6 F | DIASTOLIC BLOOD PRESSURE: 76 MMHG | RESPIRATION RATE: 18 BRPM

## 2022-09-22 DIAGNOSIS — D50.9 IRON DEFICIENCY ANEMIA, UNSPECIFIED IRON DEFICIENCY ANEMIA TYPE: Primary | ICD-10-CM

## 2022-09-22 PROCEDURE — 96365 THER/PROPH/DIAG IV INF INIT: CPT

## 2022-09-22 RX ORDER — SODIUM CHLORIDE 9 MG/ML
20 INJECTION, SOLUTION INTRAVENOUS ONCE
Status: COMPLETED | OUTPATIENT
Start: 2022-09-22 | End: 2022-09-22

## 2022-09-22 RX ORDER — SODIUM CHLORIDE 9 MG/ML
20 INJECTION, SOLUTION INTRAVENOUS ONCE
Status: CANCELLED | OUTPATIENT
Start: 2022-09-29

## 2022-09-22 RX ADMIN — IRON SUCROSE 200 MG: 20 INJECTION, SOLUTION INTRAVENOUS at 14:15

## 2022-09-22 RX ADMIN — SODIUM CHLORIDE 20 ML/HR: 9 INJECTION, SOLUTION INTRAVENOUS at 14:16

## 2022-09-29 ENCOUNTER — HOSPITAL ENCOUNTER (OUTPATIENT)
Dept: INFUSION CENTER | Facility: HOSPITAL | Age: 32
End: 2022-09-29
Attending: INTERNAL MEDICINE
Payer: COMMERCIAL

## 2022-09-29 VITALS
OXYGEN SATURATION: 97 % | HEART RATE: 86 BPM | DIASTOLIC BLOOD PRESSURE: 74 MMHG | SYSTOLIC BLOOD PRESSURE: 113 MMHG | TEMPERATURE: 98.4 F | RESPIRATION RATE: 18 BRPM

## 2022-09-29 DIAGNOSIS — D50.9 IRON DEFICIENCY ANEMIA, UNSPECIFIED IRON DEFICIENCY ANEMIA TYPE: Primary | ICD-10-CM

## 2022-09-29 PROCEDURE — 96365 THER/PROPH/DIAG IV INF INIT: CPT

## 2022-09-29 RX ORDER — SODIUM CHLORIDE 9 MG/ML
20 INJECTION, SOLUTION INTRAVENOUS ONCE
Status: COMPLETED | OUTPATIENT
Start: 2022-09-29 | End: 2022-09-29

## 2022-09-29 RX ORDER — SODIUM CHLORIDE 9 MG/ML
20 INJECTION, SOLUTION INTRAVENOUS ONCE
Status: CANCELLED | OUTPATIENT
Start: 2022-09-29

## 2022-09-29 RX ADMIN — IRON SUCROSE 200 MG: 20 INJECTION, SOLUTION INTRAVENOUS at 14:12

## 2022-09-29 RX ADMIN — SODIUM CHLORIDE 20 ML/HR: 0.9 INJECTION, SOLUTION INTRAVENOUS at 14:11

## 2022-10-12 PROBLEM — Z12.4 SCREENING FOR CERVICAL CANCER: Status: RESOLVED | Noted: 2021-10-22 | Resolved: 2022-10-12

## 2023-02-16 PROBLEM — Z86.718 PERSONAL HISTORY OF DVT (DEEP VEIN THROMBOSIS): Status: ACTIVE | Noted: 2023-02-16

## 2023-02-16 NOTE — PROGRESS NOTES
Assessment/Plan:  NGE  BCM-withdrawal and rhythm, PNV's                                          Pap smear q 5 yrs  '26  RTO 1 yr                                                                                     SBA monthly                                                                              Exercise 3/ wk - inadequate                                                                                       Calcium 1,000 mg/d with Vit D -                    Depression Screen: Neg                    Diagnoses and all orders for this visit:    Encounter for annual routine gynecological examination    IUD (intrauterine device) in place  -     Iud removal    Personal history of DVT (deep vein thrombosis)              Subjective:        Patient ID: Wally Palafox is a 33 y o  female  Dr Leslie Galvan presents today for a yearly evaluation and also the removal of the [facility] which was placed in 2021  She has been dissatisfied with the spotting before and after her menses which have been present since the IUD was inserted  Most recently she has had bleeding in between periods as well  At this point in her relationship conception would be welcomed although they would not be actively pursuing it  Withdrawal and rhythm will be practiced  Prenatal vitamins and folic acid were discussed to prevent neural tube defects  The following portions of the patient's history were reviewed and updated as appropriate: She  has a past medical history of DVT (deep venous thrombosis) ([facility] [address] ) (02/08/2021)    Patient Active Problem List    Diagnosis Date Noted   • Personal history of DVT (deep vein thrombosis) 02/16/2023   • Iron deficiency anemia 09/12/2022   • Encounter for annual routine gynecological examination 10/22/2021   • IUD (intrauterine device) in place 10/22/2021   • Current use of long term anticoagulation 05/18/2021   • Acute deep vein thrombosis (DVT) of axillary vein of right upper extremity ([facility] [address] ) 03/26/2021   • Viral infection, unspecified 11/24/2020   • Exposure to COVID-19 virus 11/24/2020   • Fatigue 07/22/2020   • Acne vulgaris 07/22/2020   • Hyperpigmentation of skin 07/22/2020   • Annual physical exam 10/30/2018   PMH:  Menarche  BV '19  UTI- E  Coli 1/20  DVT on Nuvaring 12/20- R Axillary Vein  Kyleena 4/21 - 2/23 -associated with spotting before and after menses and in '23 intermenstrual bleeding  She  has no past surgical history on file  Her family history includes Benign prostatic hyperplasia in her father; Endometriosis in her sister; Hypertension in her mother; No Known Problems in her brother and sister; Stroke in her maternal grandmother  She  reports that she has never smoked  She has never used smokeless tobacco  She reports current alcohol use  She reports that she does not use drugs  SH:  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 [facility]  She is in a monogamous relationship with Burgess Monroe since '19    He is a   Her sister has 2 girls and a boy  Current Outpatient Medications   Medication Sig Dispense Refill   • ferrous sulfate 325 (65 Fe) mg tablet Take 325 mg by mouth daily with breakfast Every other day     • levonorgestrel (Kyleena) 19 5 MG intrauterine device 1 Intra Uterine Device by Intrauterine route once       No current facility-administered medications for this visit       Current Outpatient Medications on File Prior to Visit   Medication Sig   • ferrous sulfate 325 (65 Fe) mg tablet Take 325 mg by mouth daily with breakfast Every other day   • levonorgestrel (Kyleena) 19 5 MG intrauterine device 1 Intra Uterine Device by Intrauterine route once   • [DISCONTINUED] acetaminophen-codeine (TYLENOL #2) 300-15 MG per tablet Take 1 tablet by mouth every 4 (four) hours as needed for moderate pain (Patient not taking: No sig reported)   • [DISCONTINUED] clindamycin (CLINDAGEL) 1 % gel Apply topically 2 (two) times a day (Patient not taking: No sig reported)   • [DISCONTINUED] cyclobenzaprine (FLEXERIL) 10 mg tablet Take 1 tablet (10 mg total) by mouth 3 (three) times a day as needed for muscle spasms (Patient not taking: No sig reported)   • [DISCONTINUED] warfarin (COUMADIN) 1 mg tablet BID (Patient not taking: No sig reported)   • [DISCONTINUED] warfarin (COUMADIN) 5 mg tablet Take 1 tablet daily (Patient not taking: No sig reported)     No current facility-administered medications on file prior to visit  She has No Known Allergies       Review of Systems   Constitutional: Negative for activity change, appetite change, fatigue and unexpected weight change  Eyes: Negative for visual disturbance  Respiratory: Negative for cough, chest tightness, shortness of breath and wheezing  Cardiovascular: Negative for chest pain, palpitations and leg swelling  Breast: Patient denies tenderness, nipple discharge, masses, or erythema  Gastrointestinal: Negative for abdominal distention, abdominal pain, blood in stool, constipation, diarrhea, nausea and vomiting  Endocrine: Negative for cold intolerance and heat intolerance  Genitourinary: Positive for vaginal bleeding  Negative for decreased urine volume, difficulty urinating, dyspareunia, dysuria, frequency, hematuria, menstrual problem, pelvic pain, urgency, vaginal discharge and vaginal pain  Musculoskeletal: Negative for arthralgias  Skin: Negative for rash  Neurological: Negative for weakness, light-headedness, numbness and headaches  Hematological: Does not bruise/bleed easily  Psychiatric/Behavioral: Negative for agitation, behavioral problems and sleep disturbance  The patient is not nervous/anxious  Objective:    Vitals:    02/17/23 0849   BP: 120/80   BP Location: Right arm   Patient Position: Standing   Cuff Size: Standard   Weight: 57 6 kg (127 lb)   Height: 5' 4" (1 626 m)            Physical Exam  Vitals and nursing note reviewed     Constitutional:       General: She is not in acute distress  Appearance: She is well-developed  HENT:      Head: Normocephalic and atraumatic  Eyes:      General: No scleral icterus  Right eye: No discharge  Left eye: No discharge  Extraocular Movements: Extraocular movements intact  Conjunctiva/sclera: Conjunctivae normal    Neck:      Thyroid: No thyromegaly  Trachea: No tracheal deviation  Cardiovascular:      Rate and Rhythm: Normal rate and regular rhythm  Heart sounds: Normal heart sounds  No murmur heard  Pulmonary:      Effort: Pulmonary effort is normal  No respiratory distress  Breath sounds: Normal breath sounds  No wheezing  Chest:   Breasts:     Breasts are symmetrical       Right: No inverted nipple, mass, nipple discharge, skin change or tenderness  Left: No inverted nipple, mass, nipple discharge, skin change or tenderness  Abdominal:      General: Abdomen is flat  Bowel sounds are normal  There is no distension  Palpations: Abdomen is soft  There is no mass  Tenderness: There is no abdominal tenderness  There is no guarding or rebound  Genitourinary:     General: Normal vulva  Labia:         Right: No rash, tenderness or lesion  Left: No rash, tenderness or lesion  Urethra: No urethral lesion  Vagina: Normal       Cervix: No cervical motion tenderness or discharge  Uterus: Not deviated, not enlarged and not tender  Adnexa:         Right: No mass, tenderness or fullness  Left: No mass, tenderness or fullness  Rectum: No external hemorrhoid  Comments: Urethral meatus within normal limits  Perineum within normal limits  Bladder well supported  IUD string present  The uterus is retroverted  There is a wide eversion with much mucus present  Musculoskeletal:         General: No tenderness  Normal range of motion  Cervical back: Normal range of motion and neck supple     Lymphadenopathy:      Cervical: No cervical adenopathy  Skin:     General: Skin is warm and dry  Neurological:      Mental Status: She is alert and oriented to person, place, and time  Psychiatric:         Mood and Affect: Mood normal          Behavior: Behavior normal          Thought Content: Thought content normal          Judgment: Judgment normal          Iud removal    Date/Time: 2/16/2023 4:23 PM  Performed by: Ras Kim MD  Authorized by: Ras Kim MD   Universal Protocol:  Consent: Verbal consent obtained  Written consent not obtained  Risks and benefits: risks, benefits and alternatives were discussed  Consent given by: patient  Time out: Immediately prior to procedure a "time out" was called to verify the correct patient, procedure, equipment, support staff and site/side marked as required  Patient understanding: patient states understanding of the procedure being performed  Patient consent: the patient's understanding of the procedure matches consent given  Patient identity confirmed: verbally with patient      Procedure:     Removed with no complications: yes      Other reason for removal:  Abnormal bleeding  Comments: The IUD was easily removed  It seemed to be in the proper location    This was well-tolerated

## 2023-02-17 ENCOUNTER — ANNUAL EXAM (OUTPATIENT)
Dept: OBGYN CLINIC | Facility: CLINIC | Age: 33
End: 2023-02-17

## 2023-02-17 VITALS
SYSTOLIC BLOOD PRESSURE: 120 MMHG | DIASTOLIC BLOOD PRESSURE: 80 MMHG | WEIGHT: 127 LBS | HEIGHT: 64 IN | BODY MASS INDEX: 21.68 KG/M2

## 2023-02-17 DIAGNOSIS — Z86.718 PERSONAL HISTORY OF DVT (DEEP VEIN THROMBOSIS): ICD-10-CM

## 2023-02-17 DIAGNOSIS — Z01.419 ENCOUNTER FOR ANNUAL ROUTINE GYNECOLOGICAL EXAMINATION: Primary | ICD-10-CM

## 2023-02-17 DIAGNOSIS — Z97.5 IUD (INTRAUTERINE DEVICE) IN PLACE: ICD-10-CM

## 2023-02-24 ENCOUNTER — TELEPHONE (OUTPATIENT)
Dept: HEMATOLOGY ONCOLOGY | Facility: CLINIC | Age: 33
End: 2023-02-24

## 2023-02-24 NOTE — TELEPHONE ENCOUNTER
Left message for patient stating Dr Don Hughes will not be in the office on 3/17/23 and to call our office to reschedule the appointment

## 2023-02-24 NOTE — TELEPHONE ENCOUNTER
Appointment Cancellation Or Reschedule     Person calling in Patient   If someone other than patient calling, are they listed on the communication consent form? No   Provider Dr Sav Cali   Office Visit Date and Time 3/17/23   Office Visit Location [place]   Did patient want to reschedule their office appointment? If so, when was it scheduled to? 6/5/23   Did you have STAR scheduled for this appointment? No   Do you need STAR set up for your new appointment? If yes, [facility] for [facility] rescheduling No   Is this patient calling to reschedule an infusion appointment? No   When is their next infusion appointment? NO   Is this patient a Chemo patient? No   Reason for Cancellation or Reschedule Patient was advised to reschedule/ Please call patient is any other appts open up before May    Was No show policy reviewed with patient if patient canceling within 24 hours? Yes     If the patient is cancelling an appointment and needs their STAR Transport cancelled, please route to [address]  If the patient is a treatment patient, please route this to the office nurse  If the patient is not on treatment, please route to the Clerical pool based on location  If the patient is a surgical oncology patient, please route to surg/onc clinical pool  Route message as high priority if same day cancellation

## 2023-06-05 ENCOUNTER — OFFICE VISIT (OUTPATIENT)
Dept: HEMATOLOGY ONCOLOGY | Facility: CLINIC | Age: 33
End: 2023-06-05
Payer: COMMERCIAL

## 2023-06-05 VITALS
TEMPERATURE: 97.8 F | OXYGEN SATURATION: 99 % | HEART RATE: 78 BPM | DIASTOLIC BLOOD PRESSURE: 70 MMHG | SYSTOLIC BLOOD PRESSURE: 122 MMHG | WEIGHT: 126 LBS | BODY MASS INDEX: 21.51 KG/M2 | HEIGHT: 64 IN | RESPIRATION RATE: 18 BRPM

## 2023-06-05 DIAGNOSIS — I82.A11 ACUTE DEEP VEIN THROMBOSIS (DVT) OF AXILLARY VEIN OF RIGHT UPPER EXTREMITY (HCC): ICD-10-CM

## 2023-06-05 DIAGNOSIS — D50.9 IRON DEFICIENCY ANEMIA, UNSPECIFIED IRON DEFICIENCY ANEMIA TYPE: Primary | ICD-10-CM

## 2023-06-05 PROCEDURE — 99214 OFFICE O/P EST MOD 30 MIN: CPT | Performed by: INTERNAL MEDICINE

## 2023-06-05 NOTE — PROGRESS NOTES
Hematology/Oncology Outpatient Follow-up  Junior Mendoza 33 y o  female 1990 [id number]    Date:  6/5/2023    Assessment and Plan:  1  Iron deficiency anemia, unspecified iron deficiency anemia type  The patient does not seem to be anemic with borderline low ferritin level  I did ask her to consider taking oral iron supplements on every other day basis or couple times per week to avoid further drop of her iron stores and drop of her hemoglobin level  - CBC and differential; Future  - Comprehensive metabolic panel; Future  - Magnesium; Future  - Iron Panel (Includes Ferritin, Iron Sat%, Iron, and TIBC); Future  - Ferritin; Future  - C-reactive protein; Future  - Sedimentation rate, automated; Future    2  Acute deep vein thrombosis (DVT) of axillary vein of right upper extremity (HCC)  She seems to have persistently lower than average protein S activity and antigen levels with borderline elevated D-dimer  She was told that this is more compatible with true protein S deficiency  However, there is no indication for anticoagulation at this point in time  She continues to be off of anticoagulation  We did discuss the need for indefinite anticoagulation with any clotting event  She also is aware that she will need to be on prophylactic anticoagulation once she becomes pregnant  We did briefly discuss the potential benefit of baby aspirin for prophylactic anticoagulation which may have some protection given the fact that her D-dimer continues to be mildly elevated which is indication for hypercoagulability  She is aware that she should avoid estrogen-based oral contraceptives  We will see her again in 6 months from now for close follow-up  - Protein S activity; Future  - Protein S Antigen, Total; Future  - D-dimer, quantitative;  Future      HPI:  This is a very pleasant 33year-old female 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  Mary Kate Jordan does not seem to have any obvious past medical history or family history for hypercoagulable disorder  Abbie Mijares was never pregnant and denied any miscarriages  The patient stated that she had swelling and pain of the medial aspect of her right elbow and right arm around the 8th of February 2021   She was then evaluated with a Doppler ultrasound on 02/08/2021 which showed evidence of acute occlusive, deep vein thrombosis in the distal axillary and proximal brachial veins   There was also evidence of acute superficial thrombophlebitis in the cephalic vein at the distal upper arm and proximal and mid forearm and in the basilic vein at the mid and distal upper arm and in the median cubital vein    The left upper extremity was negative for any clotting  The patient  Was then evaluated with thrombosis panel before she was started on anticoagulation with Eliquis 10 mg twice a day for a week then Eliquis 5 mg twice a day  Which she continued to take on a daily basis without any complications or bleeding  She did notice heavy menstrual bleeding after the start of Eliquis  The thrombosis panel came back positive for lupus anticoagulant   The beta 2 glycoprotein antibodies were within normal range   The anti cardiolipin IgM level was 14 which is in the indeterminate range   The protein  S activity level was 10% the protein S antigen level was 39% and protein S antigen free level was 28%   Protein C activity level was 97%  Interval history:  The patient came to for follow-up visit  She denied any significant change of her overall condition  She stated that her IUD was recently removed  She is not taking oral contraceptives  Recent blood work from 5/30/2023 showed normal hemoglobin of 12 9 with normal white cells and platelets  Creatinine was 0 8 with normal calcium and liver enzymes  D-dimer was slightly elevated 0 59  Ferritin was 34 with saturation of 21%  Magnesium 1 9  Total protein S antigen level was 44%  The protein S functional activity level was 10%    The free protein S antigen level was 29%  ROS: Review of Systems   Constitutional: Negative for chills and fever  HENT: Negative for ear pain and sore throat  Eyes: Negative for pain and visual disturbance  Respiratory: Negative for cough and shortness of breath  Cardiovascular: Negative for chest pain and palpitations  Gastrointestinal: Negative for abdominal pain and vomiting  Genitourinary: Negative for dysuria and hematuria  Musculoskeletal: Negative for arthralgias and back pain  Skin: Negative for color change and rash  Neurological: Negative for seizures and syncope  All other systems reviewed and are negative  Past Medical History:   Diagnosis Date   • DVT (deep venous thrombosis) ([facility] ) 02/08/2021    rigght arm       History reviewed  No pertinent surgical history  Social History     Socioeconomic History   • Marital status: Single     Spouse name: None   • Number of children: None   • Years of education: None   • Highest education level: None   Occupational History   • None   Tobacco Use   • Smoking status: Never   • Smokeless tobacco: Never   Vaping Use   • Vaping Use: Never used   Substance and Sexual Activity   • Alcohol use: Yes   • Drug use: No   • Sexual activity: Yes     Partners: Male     Birth control/protection: Condom Male, I U D     Other Topics Concern   • None   Social History Narrative   • None     Social Determinants of Health     Financial Resource Strain: Not on file   Food Insecurity: Not on file   Transportation Needs: Not on file   Physical Activity: Not on file   Stress: Not on file   Social Connections: Not on file   Intimate Partner Violence: Not on file   Housing Stability: Not on file       Family History   Problem Relation Age of Onset   • Hypertension Mother    • Benign prostatic hyperplasia Father    • Stroke Maternal Grandmother    • No Known Problems Sister    • No Known Problems Brother    • Endometriosis Sister        No Known "Allergies      Current Outpatient Medications:   •  ferrous sulfate 325 (65 Fe) mg tablet, Take 325 mg by mouth daily with breakfast Every other day, Disp: , Rfl:   •  levonorgestrel (Zula Links) 19 5 MG intrauterine device, 1 Intra Uterine Device by Intrauterine route once (Patient not taking: Reported on 6/5/2023), Disp: , Rfl:       Physical Exam:  /70 (BP Location: Left arm, Patient Position: Sitting, Cuff Size: Adult)   Pulse 78   Temp 97 8 °F (36 6 °C)   Resp 18   Ht 5' 4\" (1 626 m)   Wt 57 2 kg (126 lb)   SpO2 99%   BMI 21 63 kg/m²     Physical Exam  Constitutional:       General: She is not in acute distress  Appearance: She is well-developed  She is not diaphoretic  HENT:      Head: Normocephalic and atraumatic  Nose: Nose normal    Eyes:      General: No scleral icterus  Right eye: No discharge  Left eye: No discharge  Conjunctiva/sclera: Conjunctivae normal       Pupils: Pupils are equal, round, and reactive to light  Neck:      Thyroid: No thyromegaly  Vascular: No JVD  Trachea: No tracheal deviation  Cardiovascular:      Rate and Rhythm: Normal rate and regular rhythm  Heart sounds: Normal heart sounds  No murmur heard  No friction rub  Pulmonary:      Effort: Pulmonary effort is normal  No respiratory distress  Breath sounds: Normal breath sounds  No stridor  No wheezing or rales  Chest:      Chest wall: No tenderness  Abdominal:      General: There is no distension  Palpations: Abdomen is soft  There is no hepatomegaly or splenomegaly  Tenderness: There is no abdominal tenderness  There is no guarding or rebound  Musculoskeletal:         General: No tenderness or deformity  Normal range of motion  Cervical back: Normal range of motion and neck supple  Lymphadenopathy:      Cervical: No cervical adenopathy  Skin:     General: Skin is warm and dry  Coloration: Skin is not pale        Findings: No erythema " or rash  Neurological:      Mental Status: She is alert and oriented to person, place, and time  Cranial Nerves: No cranial nerve deficit  Coordination: Coordination normal       Deep Tendon Reflexes: Reflexes are normal and symmetric  Psychiatric:         Behavior: Behavior normal          Thought Content: Thought content normal          Judgment: Judgment normal            Labs:  Lab Results   Component Value Date    HCT 35 4 (L) 05/29/2021    HGB 11 5 (L) 05/29/2021    MCV 80 05/29/2021     05/29/2021    WBC 5 00 05/29/2021     Lab Results   Component Value Date    ALKPHOS 76 05/29/2021    ALT 14 05/29/2021    AST 25 05/29/2021    BUN 12 05/29/2021    CALCIUM 9 1 05/29/2021     05/29/2021    CO2 23 05/29/2021    CREATININE 0 69 05/29/2021    EGFR 136 05/29/2021    GLUF 86 05/29/2021    K 4 0 05/29/2021       Patient voiced understanding and agreement in the above discussion  Aware to contact our office with questions/symptoms in the interim

## 2023-11-30 ENCOUNTER — TELEPHONE (OUTPATIENT)
Dept: HEMATOLOGY ONCOLOGY | Facility: CLINIC | Age: 33
End: 2023-11-30

## 2023-11-30 NOTE — TELEPHONE ENCOUNTER
Appointment Change  Cancel, Reschedule, Change to Virtual      Who are you speaking with? Patient   If it is not the patient, is the caller listed on the communication consent form? N/A   Which provider is the appointment scheduled with? Dr. Chowdary Gist   When was the original appointment scheduled? Please list date and time 12/4/23 @ 1020   At which location is the appointment scheduled to take place? [place]   Was the appointment rescheduled? Was the appointment changed from an in person visit to a virtual visit? If so, please list the details of the change. no   What is the reason for the appointment change? Was already rescheduled for 12/15/23 @ 8       Was STAR transport scheduled? No   Does STAR transport need to be scheduled for the new visit (if applicable) No   Does the patient need an infusion appointment rescheduled? No   Does the patient have an upcoming infusion appointment scheduled? If so, when? No   Is the patient undergoing chemotherapy? No   For appointments cancelled with less than 24 hours:  Was the no-show policy reviewed?  N/A

## 2023-12-15 ENCOUNTER — TELEPHONE (OUTPATIENT)
Dept: HEMATOLOGY ONCOLOGY | Facility: CLINIC | Age: 33
End: 2023-12-15

## 2023-12-15 NOTE — TELEPHONE ENCOUNTER
Appointment Confirmation   Who are you speaking with? Patient   If it is not the patient, are they listed on an active communication consent form? N/A   Which provider is the appointment scheduled with? Dr. Erich Sandhoff   When is the appointment scheduled? Please list date and time 1/12/24   At which location is the appointment scheduled to take place? Virtual   Did caller verbalize understanding of appointment details?  Yes

## 2023-12-15 NOTE — TELEPHONE ENCOUNTER
Appointment Schedule   Who are you speaking with? Patient   If it is not the patient, are they listed on an active communication consent form? N/A   Which provider is the appointment scheduled with? Dr. Mojgan Rodriguez   At which location is the appointment scheduled for? [place]   When is the appointment scheduled? Please list date and time 1/12/24 2:20pm   What is the reason for this appointment? Follow up (r/s to n/s on 12/15/23)   Did patient voice understanding of the details of this appointment? Yes   Was the no show policy reviewed with patient? Yes       Patient had an appointment today with Dr Mojgan Rodriguez at [facility] and [facility] but her appointment was at the [facility] office. Patient would like to know if this appointment can be a virtual appointment. Patient would like a call back at [phone].

## 2024-01-12 ENCOUNTER — TELEMEDICINE (OUTPATIENT)
Dept: HEMATOLOGY ONCOLOGY | Facility: CLINIC | Age: 34
End: 2024-01-12
Payer: COMMERCIAL

## 2024-01-12 DIAGNOSIS — I82.A11 ACUTE DEEP VEIN THROMBOSIS (DVT) OF AXILLARY VEIN OF RIGHT UPPER EXTREMITY (HCC): ICD-10-CM

## 2024-01-12 DIAGNOSIS — D50.9 IRON DEFICIENCY ANEMIA, UNSPECIFIED IRON DEFICIENCY ANEMIA TYPE: Primary | ICD-10-CM

## 2024-01-12 PROCEDURE — 99214 OFFICE O/P EST MOD 30 MIN: CPT | Performed by: INTERNAL MEDICINE

## 2024-01-12 NOTE — PROGRESS NOTES
Virtual Regular Visit    Verification of patient location:    Patient is located at Other in the following state in which I hold an active license [place]      Assessment/Plan:  1. Iron deficiency anemia, unspecified iron deficiency anemia type  The patient does not seem to be anemic at this point.  Her ferritin remains borderline low around 30.  I did ask her to take oral iron supplements on every other day basis to avoid further drop of her ferritin and hemoglobin level.  The patient agreed with the plan.    - CBC and differential; Future  - Comprehensive metabolic panel; Future  - Ferritin; Future  - Iron Panel (Includes Ferritin, Iron Sat%, Iron, and TIBC); Future    2. Acute deep vein thrombosis (DVT) of axillary vein of right upper extremity (HCC)  She most likely has protein S deficiency since her protein S continues to be lower than average around 50%.  The diagnosis of protein S deficiency disorder is a difficult diagnosis to make.  She did have a borderline elevated D-dimer level in the past which may be a hint of increased hypercoagulability.  We did discuss the need for prophylactic anticoagulation when she becomes pregnant.  She does not seem to be taking baby aspirin prophylactically on a daily basis.    - D-dimer, quantitative; Future  - Protein S activity; Future  - Protein S Antigen, Total; Future    Problem List Items Addressed This Visit          Cardiovascular and Mediastinum    Acute deep vein thrombosis (DVT) of axillary vein of right upper extremity (HCC)    Relevant Orders    D-dimer, quantitative    Protein S activity    Protein S Antigen, Total       Other    Iron deficiency anemia - Primary    Relevant Orders    CBC and differential    Comprehensive metabolic panel    Ferritin    Iron Panel (Includes Ferritin, Iron Sat%, Iron, and TIBC)            Reason for visit is   Chief Complaint   Patient presents with    Virtual Regular Visit          Encounter provider Luis Felipe Lopez MD    Provider  located at [address] [facility] [place]  [address] [facility] [id number]      Recent Visits  No visits were found meeting these conditions.  Showing recent visits within past 7 days and meeting all other requirements  Today's Visits  Date Type Provider Dept   01/12/24 Telemedicine Luis Felipe Lopez MD [facility]   Showing today's visits and meeting all other requirements  Future Appointments  No visits were found meeting these conditions.  Showing future appointments within next 150 days and meeting all other requirements       The patient was identified by name and date of birth. Colton Bermudez was informed that this is a telemedicine visit and that the visit is being conducted through the Epic Embedded platform. She agrees to proceed..  My office door was closed. No one else was in the room.  She acknowledged consent and understanding of privacy and security of the video platform. The patient has agreed to participate and understands they can discontinue the visit at any time.    Patient is aware this is a billable service.     Subjective  Colton Bermudez is a 33 y.o. female  .      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  She does not seem to have any obvious past medical history or family history for hypercoagulable disorder.    She was never pregnant and denied any miscarriages.  The patient stated that she had swelling and pain of the medial aspect of her right elbow and right arm around the 8th of February 2021.  She was then evaluated with a Doppler ultrasound on 02/08/2021 which showed evidence of acute occlusive, deep vein thrombosis in the distal axillary and proximal brachial veins.  There was also evidence of acute superficial thrombophlebitis in the cephalic vein at the distal upper arm and proximal and mid forearm and in the basilic vein at the mid and distal upper arm and in the  median cubital vein.   The left upper extremity was negative for any clotting.  The patient  Was then evaluated with thrombosis panel before she was started on anticoagulation with Eliquis 10 mg twice a day for a week then Eliquis 5 mg twice a day  Which she continued to take on a daily basis without any complications or bleeding. She did notice heavy menstrual bleeding after the start of Eliquis.  The thrombosis panel came back positive for lupus anticoagulant.  The beta 2 glycoprotein antibodies were within normal range.  The anti cardiolipin IgM level was 14 which is in the indeterminate range.  The protein  S activity level was 10% the protein S antigen level was 39% and protein S antigen free level was 28%.  Protein C activity level was 97%.     Interval history:   The patient denied any obvious change of her overall condition since last visit.  She denied heavy menstrual bleeding.  Recent blood work on 12/12/2023 at [facility] showed creatinine 0.9 with normal calcium and liver enzymes.  Ferritin was 33.4 with saturation of 29%.  Inflammation markers are within normal range.  Protein is total antigen level was 51%.  Hemoglobin was 13.1 with normal white cells and platelets.  D-dimer was within normal range.  Past Medical History:   Diagnosis Date    DVT (deep venous thrombosis) ([facility]) 02/08/2021    [facility] arm       History reviewed. No pertinent surgical history.    Current Outpatient Medications   Medication Sig Dispense Refill    ferrous sulfate 325 (65 Fe) mg tablet Take 325 mg by mouth daily with breakfast Every other day      levonorgestrel (Kyleena) 19.5 MG intrauterine device 1 Intra Uterine Device by Intrauterine route once (Patient not taking: Reported on 6/5/2023)       No current facility-administered medications for this visit.        No Known Allergies    Review of Systems   Constitutional:  Negative for chills and fever.   HENT:  Negative for ear pain and sore throat.    Eyes:  Negative for pain and  visual disturbance.   Respiratory:  Negative for cough and shortness of breath.    Cardiovascular:  Negative for chest pain and palpitations.   Gastrointestinal:  Negative for abdominal pain and vomiting.   Genitourinary:  Negative for dysuria and hematuria.   Musculoskeletal:  Negative for arthralgias and back pain.   Skin:  Negative for color change and rash.   Neurological:  Negative for seizures and syncope.   All other systems reviewed and are negative.      Video Exam    There were no vitals filed for this visit.    Physical Exam  Constitutional:       Appearance: Normal appearance.   HENT:      Head: Normocephalic and atraumatic.      Nose: Nose normal.   Eyes:      Extraocular Movements: Extraocular movements intact.      Pupils: Pupils are equal, round, and reactive to light.   Pulmonary:      Effort: Pulmonary effort is normal.   Musculoskeletal:         General: Normal range of motion.      Cervical back: Normal range of motion and neck supple.   Neurological:      Mental Status: She is alert and oriented to person, place, and time.   Psychiatric:         Mood and Affect: Mood normal.         Behavior: Behavior normal.         Thought Content: Thought content normal.         Judgment: Judgment normal.          Visit Time  Total Visit Duration: 20min

## 2024-02-21 PROBLEM — Z01.419 ENCOUNTER FOR ANNUAL ROUTINE GYNECOLOGICAL EXAMINATION: Status: RESOLVED | Noted: 2021-10-22 | Resolved: 2024-02-21

## 2024-02-23 ENCOUNTER — NON-APPOINTMENT (OUTPATIENT)
Age: 34
End: 2024-02-23

## 2024-09-10 ENCOUNTER — TELEPHONE (OUTPATIENT)
Dept: HEMATOLOGY ONCOLOGY | Facility: CLINIC | Age: 34
End: 2024-09-10